# Patient Record
Sex: MALE | Race: BLACK OR AFRICAN AMERICAN | NOT HISPANIC OR LATINO | Employment: OTHER | URBAN - METROPOLITAN AREA
[De-identification: names, ages, dates, MRNs, and addresses within clinical notes are randomized per-mention and may not be internally consistent; named-entity substitution may affect disease eponyms.]

---

## 2018-12-20 ENCOUNTER — HOSPITAL ENCOUNTER (OUTPATIENT)
Dept: OCCUPATIONAL THERAPY | Facility: HOSPITAL | Age: 43
End: 2018-12-21
Attending: HOSPITALIST | Admitting: HOSPITALIST

## 2018-12-20 LAB
ABS NEUT (OLG): 3.14 X10(3)/MCL (ref 2.1–9.2)
ALBUMIN SERPL-MCNC: 3.9 GM/DL (ref 3.4–5)
ALBUMIN/GLOB SERPL: 1 RATIO (ref 1.1–2)
ALP SERPL-CCNC: 99 UNIT/L (ref 50–136)
ALT SERPL-CCNC: 26 UNIT/L (ref 12–78)
AMPHET UR QL SCN: ABNORMAL
APPEARANCE, UA: CLEAR
APTT PPP: 28.8 SECOND(S) (ref 24.8–36.9)
AST SERPL-CCNC: 19 UNIT/L (ref 15–37)
BACTERIA SPEC CULT: ABNORMAL /HPF
BARBITURATE SCN PRESENT UR: NEGATIVE
BASOPHILS # BLD AUTO: 0 X10(3)/MCL (ref 0–0.2)
BASOPHILS NFR BLD AUTO: 0 %
BENZODIAZ UR QL SCN: ABNORMAL
BILIRUB SERPL-MCNC: 0.3 MG/DL (ref 0.2–1)
BILIRUB UR QL STRIP: NEGATIVE
BILIRUBIN DIRECT+TOT PNL SERPL-MCNC: 0.1 MG/DL (ref 0–0.5)
BILIRUBIN DIRECT+TOT PNL SERPL-MCNC: 0.2 MG/DL (ref 0–0.8)
BUN SERPL-MCNC: 11 MG/DL (ref 7–18)
CALCIUM SERPL-MCNC: 8.6 MG/DL (ref 8.5–10.1)
CANNABINOIDS UR QL SCN: ABNORMAL
CHLORIDE SERPL-SCNC: 101 MMOL/L (ref 98–107)
CO2 SERPL-SCNC: 27 MMOL/L (ref 21–32)
COCAINE UR QL SCN: ABNORMAL
COLOR UR: YELLOW
CREAT SERPL-MCNC: 0.74 MG/DL (ref 0.7–1.3)
EOSINOPHIL # BLD AUTO: 0.1 X10(3)/MCL (ref 0–0.9)
EOSINOPHIL NFR BLD AUTO: 2 %
ERYTHROCYTE [DISTWIDTH] IN BLOOD BY AUTOMATED COUNT: 13.2 % (ref 11.5–17)
GLOBULIN SER-MCNC: 3.9 GM/DL (ref 2.4–3.5)
GLUCOSE (UA): NEGATIVE
GLUCOSE SERPL-MCNC: 79 MG/DL (ref 74–106)
HCT VFR BLD AUTO: 42.8 % (ref 42–52)
HGB BLD-MCNC: 13.5 GM/DL (ref 14–18)
HGB UR QL STRIP: NEGATIVE
INR PPP: 1 (ref 0–1.3)
KETONES UR QL STRIP: ABNORMAL
LEUKOCYTE ESTERASE UR QL STRIP: NEGATIVE
LYMPHOCYTES # BLD AUTO: 1.3 X10(3)/MCL (ref 0.6–4.6)
LYMPHOCYTES NFR BLD AUTO: 26 %
MCH RBC QN AUTO: 28.3 PG (ref 27–31)
MCHC RBC AUTO-ENTMCNC: 31.5 GM/DL (ref 33–36)
MCV RBC AUTO: 89.7 FL (ref 80–94)
MONOCYTES # BLD AUTO: 0.5 X10(3)/MCL (ref 0.1–1.3)
MONOCYTES NFR BLD AUTO: 10 %
NEUTROPHILS # BLD AUTO: 3.14 X10(3)/MCL (ref 2.1–9.2)
NEUTROPHILS NFR BLD AUTO: 61 %
NITRITE UR QL STRIP: NEGATIVE
OPIATES UR QL SCN: POSITIVE
PCP UR QL: ABNORMAL
PH UR STRIP.AUTO: 5.5 [PH] (ref 5–7.5)
PH UR STRIP: 5.5 [PH] (ref 5–9)
PLATELET # BLD AUTO: 273 X10(3)/MCL (ref 130–400)
PMV BLD AUTO: 9.7 FL (ref 9.4–12.4)
POTASSIUM SERPL-SCNC: 4 MMOL/L (ref 3.5–5.1)
PROT SERPL-MCNC: 7.8 GM/DL (ref 6.4–8.2)
PROT UR QL STRIP: NEGATIVE
PROTHROMBIN TIME: 13.6 SECOND(S) (ref 12.2–14.7)
RBC # BLD AUTO: 4.77 X10(6)/MCL (ref 4.7–6.1)
RBC #/AREA URNS HPF: ABNORMAL /[HPF]
SODIUM SERPL-SCNC: 138 MMOL/L (ref 136–145)
SP GR FLD REFRACTOMETRY: 1.03 (ref 1–1.03)
SP GR UR STRIP: 1.03 (ref 1–1.03)
SQUAMOUS EPITHELIAL, UA: ABNORMAL
TROPONIN I SERPL-MCNC: <0.02 NG/ML (ref 0.02–0.49)
TSH SERPL-ACNC: 0.49 MIU/L (ref 0.36–3.74)
UROBILINOGEN UR STRIP-ACNC: 0.2
WBC # SPEC AUTO: 5.1 X10(3)/MCL (ref 4.5–11.5)
WBC #/AREA URNS HPF: ABNORMAL /HPF

## 2018-12-21 LAB
INR PPP: 1.1 (ref 0–1.3)
PROTHROMBIN TIME: 14.4 SECOND(S) (ref 12.2–14.7)

## 2020-08-04 ENCOUNTER — HISTORICAL (OUTPATIENT)
Dept: ADMINISTRATIVE | Facility: HOSPITAL | Age: 45
End: 2020-08-04

## 2020-08-04 LAB — RAPID GROUP A STREP (OHS): NEGATIVE

## 2022-03-07 ENCOUNTER — HISTORICAL (OUTPATIENT)
Dept: ADMINISTRATIVE | Facility: HOSPITAL | Age: 47
End: 2022-03-07

## 2022-04-11 ENCOUNTER — HISTORICAL (OUTPATIENT)
Dept: ADMINISTRATIVE | Facility: HOSPITAL | Age: 47
End: 2022-04-11
Payer: MEDICARE

## 2022-04-27 VITALS
OXYGEN SATURATION: 96 % | DIASTOLIC BLOOD PRESSURE: 85 MMHG | BODY MASS INDEX: 25.26 KG/M2 | WEIGHT: 160.94 LBS | SYSTOLIC BLOOD PRESSURE: 125 MMHG | HEIGHT: 67 IN

## 2022-04-30 NOTE — ED PROVIDER NOTES
Patient:   Bryon Paulson            MRN: [id number]            FIN: [id number]               Age:   43 years     Sex:  Male     :  1975   Associated Diagnoses:   Deep venous thrombosis of right upper extremity; Subclavian vein thrombosis, right; Arm pain-swelling; Chronic pain syndrome; Crohn's disease; Hypertension   Author:   Kenneth Burnett MD      Basic Information   Time seen: Date & time 2018 12:49:00.   History source: Patient.   Arrival mode: Private vehicle, walking.   History limitation: None.   Additional information: Chief Complaint from Nursing Triage Note : Chief Complaint   2018 12:03 CST     Chief Complaint           Rt arm swelling w/bruising, painful radiating towards Rt hand/fingers x monday. Swelling comes and goes.  Threw 12 pack of soda with Rt arm  , I have assummed care of this patient at 1449 . DWMMD.      History of Present Illness   The patient presents with right, arm pain, arm swelling, This is a 43-year-old male who presents with nontraumatic right arm pain and swelling for the past 3 days.  Some ecchymosis is noted around his elbow area.,     44 y/o black male with hx of Crohn's Disease, HTN, and gastritisis presents to ED c/o arm pain and swelling. Pt denies of coughing, irregular bowel movements, any heart trouble, or being on any anticoagulants. Pt reports being on Lisinopril, Zofran, and anti-inflammatory medication for Crohn's Disease. Pt was given 1000 mL of NS in ED. Pt was given a deep midline IV in ED but not a pickline in previous visit. .  The onset was 18.  The course/duration of symptoms is constant.  Type of injury: Pain and swelling.  Location: Right arm. Radiating pain: Right hand and digits. The character of symptoms is pain and swelling.  The degree of pain is moderate.  The degree of swelling is moderate.  The exacerbating factor is none.  The relieving factor is none.  Risk factors consist of Crohn's Disease.  The patient's  dominant hand is the right hand.  Prior episodes: none.  Therapy today: none.  Associated symptoms: none.        Review of Systems   Constitutional symptoms:  Negative except as documented in HPI.   Skin symptoms:  Negative except as documented in HPI.   Eye symptoms:  Negative except as documented in HPI.   ENMT symptoms:  Negative except as documented in HPI.   Respiratory symptoms:  No cough,    Cardiovascular symptoms:  Negative except as documented in HPI.   Gastrointestinal symptoms:  No diarrhea,    Genitourinary symptoms:  Negative except as documented in HPI.   Musculoskeletal symptoms:  Reports: Right, upper arm, pain, swelling.   Neurologic symptoms:  Negative except as documented in HPI.   Psychiatric symptoms:  Negative except as documented in HPI.   Endocrine symptoms:  Negative except as documented in HPI.   Hematologic/Lymphatic symptoms:  Negative except as documented in HPI.   Allergy/immunologic symptoms:  Negative except as documented in HPI.             Additional review of systems information: All other systems reviewed and otherwise negative.      Health Status   Allergies:    Allergic Reactions (All)  Severity Not Documented  Bentyl- Decrease in loc.  Nubain- No reactions were documented.  Toradol- No reactions were documented.  Ultracet- No reactions were documented.  Canceled/Inactive Reactions (All)  Severity Not Documented  Dilaudid- No reactions were documented.  Imodium A-D- No reactions were documented..   Medications:  (Selected)   Inpatient Medications  Ordered  Zofran 2 mg/mL injectable solution: 8 mg, form: Injection, IV Push, Once, first dose 18 12:12:00 CDT, stop date 18 12:12:00 CDT, STAT  Prescriptions  Prescribed  Percocet 5/325 oral tablet: 1 tab(s), Oral, q4hr, PRN PRN for pain, # 20 tab(s), 0 Refill(s)  Zofran ODT 4 mg oral tablet, disintegratin mg = 1 tab(s), Oral, q6hr, PRN PRN nausea, # 20 tab(s), 0 Refill(s), Pharmacy: [facility]/pharmacy #[id number]  Zofran ODT 8 mg  oral tablet, disintegratin, SL, q6hr, PRN PRN nausea/vomiting, # 12 tab(s), 0 Refill(s)  Documented Medications  Documented  LISINOPRIL 10 MG TABLET: 10 mg = 1 tab(s), Oral, Daily.   Immunizations: Influenza, no tetanus up to date, no pneumococcal.      Past Medical/ Family/ Social History   Medical history:    Resolved  Hypertension ([id number]):  Resolved.  Crohn disease (3286216894):  Resolved.  Gastritis (7861528):  Resolved.  GI bleed ([id number]):  Resolved..   Surgical history:    Resection of small intestine ([id number]).  Cholecystectomy (92685054).  insertion and removel of mediport..   Family history:     Father: Living and has HTN, DM  Mother: Living and has HTN, DM.   Social history: Alcohol use: Denies, Tobacco use: Denies, Drug use: Denies, Occupation: Employed, Family/social situation: Unmarried, lives with parent(s).      Physical Examination               Vital Signs   Vital Signs   2018 12:03 CST     Temperature Oral          37.1 DegC                             Temperature Oral (calculated)             98.78 DegF                             Temperature Tympanic      In Error DegC  (In Error)                            Peripheral Pulse Rate     104 bpm  HI                             Respiratory Rate          20 br/min                             SpO2                      98 %                             Systolic Blood Pressure   139 mmHg                             Diastolic Blood Pressure  92 mmHg  HI  .      No qualifying data available.   Measurements   2018 12:03 CST     Weight Dosing             75.1 kg                             Weight Measured           75.1 kg                             Weight Measured and Calculated in Lbs     165.57 lb                             Height/Length Dosing      169 cm                             Height/Length Measured    169 cm                             BSA Measured              1.88 m2                              Body Mass Index Measured  26.29 kg/m2  .   Basic Oxygen Information   12/20/2018 12:03 CST     SpO2                      98 %  .   General:  Alert, no acute distress, not anxious, not ill-appearing.    Skin:  Warm, dry, no pallor, no rash, normal for ethnicity.    Head:  Normocephalic, atraumatic.    Neck:  Supple, trachea midline, no tenderness, no carotid bruit.    Eye:  Pupils are equal, round and reactive to light, extraocular movements are intact, normal conjunctiva.    Ears, nose, mouth and throat:  Tympanic membranes clear, oral mucosa moist, no pharyngeal erythema or exudate.    Cardiovascular:  Regular rate and rhythm, No murmur, No edema, Capillary refill: <3 seconds.    Respiratory:  Lungs are clear to auscultation, respirations are non-labored, breath sounds are equal.    Chest wall:  No tenderness, No deformity.    Back:  Nontender, Normal range of motion, Normal alignment, no step-offs.    Musculoskeletal:  Normal ROM, normal strength, no tenderness, no swelling, no deformity, Swelling of the right arm extends from the fingers up into the shoulder involving jugulovenous distention on the right side of the neck.  There is fullness of the right neck not present on the left.  Fingers is slightly dusky.  The capillary refill is adequate rate and ulnar pulses are intact., Distal upper extremity: Right, swelling.    Gastrointestinal:  Soft, Nontender, Non distended, Normal bowel sounds, No organomegaly.    Genitourinary:  no CVA tenderness.   Neurological:  Alert and oriented to person, place, time, and situation, No focal neurological deficit observed, CN II-XII intact, normal sensory observed, normal motor observed, normal speech observed, normal coordination observed, normal and symmetrical reflexes, Right arm distal neurovascularly intact.    Lymphatics:  No lymphadenopathy.   Psychiatric:  Cooperative, appropriate mood & affect, normal judgment, non-suicidal.       Medical Decision Making   Differential  "Diagnosis:  deep venous thrombosis arterial occlusion.   Documents reviewed:  Emergency department nurses' notes.   Orders  Launch Order Profile (Selected)   Inpatient Orders  Ordered  30 Day Readmission: 18 12:56:56 CST, Stop date 18 12:56:56 CST, "This patient has had an inpatient, observation, outpatient bedded or emergency visit within the last 30 days."  Admission Assessment Adult: 18 15:30:34 CST, Stop date 18 15:30:34 CST  Admission History Adult: 18 15:30:34 CST, Stop date 18 15:30:34 CST  Admit to Outpatient Observation: 18 15:07:00 CST, Beth MCKEON, Radha PALOMINO Remote Telemetry, Deep venous thrombosis of right upper extremity  Subclavian vein thrombosis, right  Crohn's disease  Hypertension  Chronic pain syndrome, No  Basic Admission Information Adult: 18 15:30:34 CST, Stop date 18 15:30:34 CST  Capacity Management Bed Request: 18 15:09:41 CST, Remote Telemetry  Discharge Planning Ongoing Assessment: 18 9:00:00 CST, q3day  EK18 14:49:00 CST, Stat, 18 14:49:00 CST, Wheelchair, Patient Has IV, -1, -1, 18 14:49:00 CST  Fall Risk Protocol: 18 14:31:54 CST, Constant Order  Lovenox: 80 mg, form: Injection, Subcutaneous, q12hr, first dose 18 15:07:00 CST, NOW  [id number] 1,000 mL: 1,000 mL, 1,000 mL, IV, 125 mL/hr, start date 18 14:49:00 CST  Ordered (Dispatched)  Drug Screen Urine: Stat collect, Urine, 18 15:04:00 CST, Stop date 18 15:04:00 CST, Nurse collect, Print Label By Order Location, 18 15:04:00 CST  UA Total a reflex to culture: Stat collect, Urine, 18 14:49:00 CST, Once, Stop date 18 14:50:00 CST, Nurse collect, Print Label By Order Location  Ordered (Exam Started)  XR Chest 1 View: Stat, 18 14:49:00 CST, Cough, None, Wheelchair, Patient Has IV?, Rad Type, Not Scheduled, 18 14:49:00 CST  Ordered (In-Lab)  Automated Diff: Now collect, 18 15:30:00 CST, Blood, " Collected, Stop date 12/20/18 15:30:00 CST, Lab Collect, Print Label By Order Location, 12/20/18 14:49:00 CST  CBC w/ Auto Diff: Now collect, 12/20/18 14:49:00 CST, Blood, Stop date 12/20/18 14:50:00 CST, Lab Collect, Print Label By Order Location, 12/20/18 14:49:00 CST  CMP: Stat collect, 12/20/18 14:49:00 CST, Blood, Stop date 12/20/18 14:50:00 CST, Lab Collect, Print Label By Order Location, 12/20/18 14:49:00 CST  INR - Protime: Stat collect, 12/20/18 14:49:00 CST, Blood, Once, Stop date 12/20/18 14:50:00 CST, Lab Collect, Print Label By Order Location, 12/20/18 14:49:00 CST  PTT: Stat collect, 12/20/18 14:49:00 CST, Blood, Once, Stop date 12/20/18 14:50:00 CST, Lab Collect, Print Label By Order Location, 12/20/18 14:49:00 CST  TSH: Stat collect, 12/20/18 14:49:00 CST, Blood, Once, Stop date 12/20/18 14:50:00 CST, Lab Collect, Print Label By Order Location, 12/20/18 14:49:00 CST  Troponin-I: Stat collect, 12/20/18 14:49:00 CST, Blood, Stop date 12/20/18 14:50:00 CST, Lab Collect, Print Label By Order Location, 12/20/18 14:49:00 CST  Completed  US NIVA Arterial Upper Ext Right: 12/20/18 12:51:00 CST, 12/20/18 12:51:00 CST, Wheelchair, Patient Has IV, -1, -1, 12/20/18 12:51:00 CST  US NIVA Venous Upper Ext Right: 12/20/18 12:51:00 CST, Stop date 12/20/18 12:51:00 CST, Wheelchair, Patient has IV.   Results review:  Lab work has been ordered and is pending patient is admitted with the extensive DVT in the right upper extremity is speaking with the patient.  He did have a midline IV deep started here in the emergency department with ultrasound assistance last admission it was not a PICC line was not a long-term IV..      Reexamination/ Reevaluation   Time: 12/20/2018 15:58:00 .   Vital signs   results included from flowsheet : Vital Signs   12/20/2018 15:50 CST     Peripheral Pulse Rate     99 bpm                             SpO2                      100 %                             Oxygen Therapy            Room air                              Systolic Blood Pressure   143 mmHg  HI                             Diastolic Blood Pressure  116 mmHg  HI                             Mean Arterial Pressure, Cuff              125 mmHg     Interventions: Patient is admitted as stated above last admission patient had a midline deep ultrasound initiated in the emergency department with ultrasound assistance.  He did not have a long-term PICC line however.      Impression and Plan   Diagnosis   Deep venous thrombosis of right upper extremity (ROA95-LK I82.621)   Subclavian vein thrombosis, right (SVT70-OP I82.B11)   Arm pain-swelling (PNED [id number])   Chronic pain syndrome (OYR64-VA G89.4)   Crohn's disease (RAE99-PF K50.90)   Hypertension (QVR78-MM I10)      Calls-Consults   -  12/20/2018 15:59:00 , Beth MCKEON, Radha DILL    Plan   Condition: Unchanged.    Disposition: Admit time  12/20/2018 15:59:00, Admit to Inpatient Unit.    Counseled: Patient, Regarding diagnosis, Regarding diagnostic results, Regarding treatment plan, Patient indicated understanding of instructions.    Notes:   I, Chloe Cronin, acted solely as a scribe for and in the presence of Dr. Burnett, who performed the service. , I, Kenneth Burnett MD, a physician licensed to practice in this state, have  performed the physical evaluation,  history gathering,  and medical decision making that is reflected in this record..   BLADIMIR Burnett MD.

## 2022-04-30 NOTE — DISCHARGE SUMMARY
Patient:   Bryon Paulson            MRN: [id number]            FIN: [id number]               Age:   43 years     Sex:  Male     :  1975   Associated Diagnoses:   Crohn's disease; Hypertension; Chronic pain syndrome; Acute deep vein thrombosis (DVT)   Author:   Ja Barth MD      Discharge Information      Discharge Summary Information   Admit/Discharge Dates   Admit Date: 2018  Discharge Date: 2018   Physicians   Attending Physician - aJ Barth MD  Attending Physician - ER, Physician  Admitting Physician - ER, Physician  Consulting Physician - Radha Campos MD  Primary Care Physician - PCP, Clinic   Discharge Diagnosis   Crohn's disease, unspecified, without complications (K50.90)   Essential (primary) hypertension (I10)   Acute embolism and thrombosis of unspecified deep veins of unspecified lower extremity (I82.409)   Chronic pain syndrome (G89.4)    Procedures   No procedures recorded for this visit.   Immunizations   No immunizations recorded for this visit.   Discharge Medications   Prescribed  acetaminophen-HYDROcodone (acetaminophen-hydrocodone 325 mg-10 mg oral tablet) 1 tab(s), Oral, q4hr, PRN as needed for pain  lisinopril (lisinopril 10 mg oral tablet) 10 mg, Oral, Daily  methylPREDNISolone (methylPREDNISolone 4 mg oral tab) See Instructions  rivaroxaban (Xarelto Starter Pack 15 mg-20 mg oral kit) See Instructions  Continue  acetaminophen-HYDROcodone (HYDROCO/APAP TAB 10-325MG), Oral, q4hr  acetaminophen-oxyCODONE (Percocet 5/325 oral tablet) 1 tab(s), Oral, q4hr, PRN for pain  lisinopril (LISINOPRIL 10 MG TABLET) 10 mg, Oral, Daily  ondansetron (ONDANSETRON ODT 8 MG TABLET), Oral, q6hr      Education   Discharge - 18 8:24:00 CST, Home, Give all scheduled vaccinations prior to discharge.   Discharge Activity - Activity as Tolerated   Discharge Diet - Regular       Hospital Course   Hospital Course   The patient states that he was in his usual state of  health and started noticing swelling to the right upper extremity over the past couple of days. He continued to get worse and cause him some pain so he came in to be evaluated. He denies any trauma to the arm or immobility. He has no history of any blood clots in the past and no history in his family. Of note he does state that he had a hospitalization over Thanksgiving for Crohn's disease at which time he had what sounds like a midline catheter in that same arm. He states that it was removed prior to discharge and he does not recall any issues with it. He denies any other complaints at this time. With his history of Crohn's disease he is not on any immunosuppressants currently and has not been for many years. On morning of discharge he is feeling well.  Pain controlled.  I counselled him on risks and benefits of intiating Xarelto.  CM has been consulted to assure he can afford it.  I discussed that I would give him a short course of pain medicine but he would need to follow up with his PCP.  I informed the patient of the risks associated with opiate use and the option to fill less than the prescribed amount.  Will need oral AC for minimum 4 - 6 months.      Time to discharge 31 minutes  .        Physical Examination      Vital Signs (last 24 hrs)_____  Last Charted___________  Temp Oral     37 DegC  (DEC 21 07:09)  Heart Rate Peripheral   95 bpm  (DEC 21 07:09)  Resp Rate         20 br/min  (DEC 21 07:09)  SBP      H 143mmHg  (DEC 21 07:09)  DBP      H 98mmHg  (DEC 21 07:09)  SpO2      100 %  (DEC 21 07:09)   General:  Alert and oriented, No acute distress.    Eye:  Pupils are equal, round and reactive to light, Extraocular movements are intact, Normal conjunctiva.    HENT:  Normocephalic, Oral mucosa is moist.    Neck:  Supple, Non-tender, No lymphadenopathy.    Respiratory:  Lungs are clear to auscultation, Respirations are non-labored, Breath sounds are equal, Symmetrical chest wall expansion.    Cardiovascular:   Normal rate, Regular rhythm, No murmur, No gallop, Good pulses equal in all extremities, No edema.    Gastrointestinal:  Soft, Non-tender, Non-distended, Normal bowel sounds, No organomegaly.    Musculoskeletal:  Normal range of motion, No deformity, Normal gait.    Integumentary:  Warm, Dry, Intact.    Neurologic:  Alert, Oriented, No focal deficits.    Psychiatric:  Cooperative, Appropriate mood & affect.       Discharge Plan   Diagnosis     Crohn's disease (GRO17-WI K50.90).     Hypertension (ZKK31-PA I10).     Chronic pain syndrome (POX76-DM G89.4).     Acute deep vein thrombosis (DVT) (QDA29-NN I82.409).

## 2022-05-05 NOTE — HISTORICAL [facility]
This is a historical note converted from [employer]. Formatting and pictures may have been removed.  Please reference [employer] for original formatting and attached multimedia. Chief Complaint  reports R arm pain and swelling x3 days. denies fever. denies injury or trauma to area.  History of Present Illness  The patient states that he was?in his usual state of health and started noticing?swelling to the right upper extremity?over the past couple of days.? He continued to get worse?and?cause him some pain so he came in to be evaluated.? He denies any trauma?to the arm?or immobility. ?He has no history of any blood clots in the past and no history in his family.? Of note he does state that he had?a hospitalization?over Thanksgiving for Crohns disease at which time he had?what sounds like a?midline catheter?in that same arm. ?He states that it was removed prior to discharge and he does not recall any issues with it.? He denies any other complaints at this time.? With his history of Crohns disease he is not on any immunosuppressants currently and has not been for many years.  Review of Systems  Constitutional: No fever or chills, no weakness or fatigue.  Ear/Nose/Mouth/Throat: No nasal congestion or sore throat.  Respiratory: No shortness of breath or cough.  Cardiovascular: No chest pain or palpitations but he does have edema in the right upper extremity  Gastrointestinal: No nausea, vomiting, or abdominal pain.  Genitourinary: No dysuria.  Musculoskeletal: Pain in the right arm  Neurologic: No weakness, tingling, numbness.  Integumentary: Negative.  Physical Exam  Vitals & Measurements  T:?36.7? ?C (Oral)? TMIN:?36.6? ?C (Oral)? TMAX:?36.7? ?C (Oral)? HR:?99(Peripheral)? RR:?14? BP:?152/105? SpO2:?100%? WT:?77?kg? WT:?77?kg?  General: Well-developed, well-nourished, no acute distress, alert and oriented x3.  HEENT: Normocephalic, atraumatic, sclera nonicteric, conjunctiva clear  Heart: Regular rate and rhythm without  gallops or murmurs.  Lungs: Bilaterally clear to auscultation.  Abdomen: Soft, non-tender, positive normoactive bowel sounds.  Extremities: No cyanosis or clubbing?but he does have swelling to the right upper extremity that is nonpitting. ?He has a scar?in the medial bicep area?from where the last?IV was placed?that is well-healed.? He has 2+ pulses in all extremities and good capillary refill.  Neurologic: CN II-XII intact, no focal weakness, gait normal  Psychiatric: Normal mood and affect, normal judgement  Integument: No rashes or lesions, intact  ?  Assessment/Plan  Chronic pain syndrome?G89.4  Crohns disease?K50.90  Deep venous thrombosis of right upper extremity?I82.621  Hypertension?I10  Subclavian vein thrombosis, right?I82.B11  Patient is stable at this time and has been started on full dose Lovenox.? Even though it has been?several weeks my suspicion is that?he had some early development of?a DVT with the placement of the midline catheter?that progressed slowly over the last?few weeks to the point that it is now.? He will need oral anticoagulants for?6 months and he may need a workup for?hypercoagulable state?once he has completed it.? I will consult the  to evaluate for?whether or not he can have?an anticoagulant other than warfarin?which would be the most ideal.? If that is the case he can be started on that and discharged?in the morning.? He does not currently have a PCP?and he had recently called?Medicaid in order to try to obtain a new one.   Problem List/Past Medical History  Ongoing  Deep venous thrombosis of right upper extremity  Hypertension  Historical  Crohn disease  Gastritis  GI bleed  Procedure/Surgical History  Cholecystectomy  insertion and removel of mediport  Resection of small intestine   Medications  Inpatient  Lovenox, 80 mg= 0.8 mL, Subcutaneous, q12hr  [id number] 1,000 mL, 1000 mL, IV  Zofran 2 mg/mL injectable solution, 8 mg= 4 mL, IV Push, Once  Home  HYDROCO/APAP TAB  10-325MG, Oral, q4hr  LISINOPRIL 10 MG TABLET, 10 mg= 1 tab(s), Oral, Daily  METHYLPREDNISOLONE 4 MG DOSEPK,? ?Not Taking, Completed Rx  ONDANSETRON ODT 8 MG TABLET, Oral, q6hr  Percocet 5/325 oral tablet, 1 tab(s), Oral, q4hr, PRN  Allergies  Bentyl?(decrease in LOC)  Nubain  Toradol  Ultracet  Social History  Alcohol - Denies Alcohol Use, 02/10/2012  Employment/School - No Risk, 05/18/2012  Exercise - Does not exercise, 05/18/2012  Home/Environment - No Risk, 05/18/2012  Nutrition/Health - No Risk, 05/18/2012  Other - No Risk, 05/18/2012  Sexual - No Risk, 05/18/2012  Substance Abuse - Denies Substance Abuse, 02/10/2012  Tobacco - Denies Tobacco Use, 02/10/2012  Never smoker, N/A, 12/20/2018  Never smoker, No, 11/22/2018  Family History  Diabetes mellitus type 1: Negative: Mother.  Hypertension.: Mother and Father.  Immunizations  Vaccine Date Status   influenza virus vaccine, inactivated 11/23/2018 Given   Lab Results  Labs Last 24 Hours?  ?Chemistry? Hematology/Coagulation?   Sodium Lvl: 138 mmol/L (12/20/18 15:30:00) PT: 13.6 second(s) (12/20/18 15:30:00)   Potassium Lvl: 4 mmol/L (12/20/18 15:30:00) INR: 1 (12/20/18 15:30:00)   Chloride: 101 mmol/L (12/20/18 15:30:00) PTT: 28.8 second(s) (12/20/18 15:30:00)   CO2: 27 mmol/L (12/20/18 15:30:00) WBC: 5.1 x10(3)/mcL (12/20/18 15:30:00)   Calcium Lvl: 8.6 mg/dL (12/20/18 15:30:00) RBC: 4.77 x10(6)/mcL (12/20/18 15:30:00)   Glucose Lvl: 79 mg/dL (12/20/18 15:30:00) Hgb:?13.5 gm/dL?Low (12/20/18 15:30:00)   BUN: 11 mg/dL (12/20/18 15:30:00) Hct: 42.8 % (12/20/18 15:30:00)   Creatinine: 0.74 mg/dL (12/20/18 15:30:00) Platelet: 273 x10(3)/mcL (12/20/18 15:30:00)   eGFR-AA: >60 (12/20/18 15:30:00) MCV: 89.7 fL (12/20/18 15:30:00)   eGFR-FRANCA: >60 (12/20/18 15:30:00) MCH: 28.3 pg (12/20/18 15:30:00)   Bili Total: 0.3 mg/dL (12/20/18 15:30:00) MCHC:?31.5 gm/dL?Low (12/20/18 15:30:00)   Bili Direct: 0.1 mg/dL (12/20/18 15:30:00) RDW: 13.2 % (12/20/18 15:30:00)   Bili  Indirect: 0.2 mg/dL (12/20/18 15:30:00) MPV: 9.7 fL (12/20/18 15:30:00)   AST: 19 unit/L (12/20/18 15:30:00) Abs Neut: 3.14 x10(3)/mcL (12/20/18 15:30:00)   ALT: 26 unit/L (12/20/18 15:30:00) Neutro Auto: 61 % (12/20/18 15:30:00)   Alk Phos: 99 unit/L (12/20/18 15:30:00) Lymph Auto: 26 % (12/20/18 15:30:00)   Total Protein: 7.8 gm/dL (12/20/18 15:30:00) Mono Auto: 10 % (12/20/18 15:30:00)   Albumin Lvl: 3.9 gm/dL (12/20/18 15:30:00) Eos Auto: 2 % (12/20/18 15:30:00)   Globulin:?3.9 gm/dL?High (12/20/18 15:30:00) Abs Eos: 0.1 x10(3)/mcL (12/20/18 15:30:00)   A/G Ratio:?1 ratio?Low (12/20/18 15:30:00) Basophil Auto: 0 % (12/20/18 15:30:00)   Troponin-I: <0.02 (12/20/18 15:30:00) Abs Neutro: 3.14 x10(3)/mcL (12/20/18 15:30:00)   TSH: 0.495 mIU/L (12/20/18 15:30:00) Abs Lymph: 1.3 x10(3)/mcL (12/20/18 15:30:00)   U pH: 5.5 (12/20/18 15:45:00) Abs Mono: 0.5 x10(3)/mcL (12/20/18 15:30:00)   U Spec Grav: 1.034 (12/20/18 15:45:00) Abs Baso: 0 x10(3)/mcL (12/20/18 15:30:00)   ?  ?  Diagnostic Results  Right upper extremity ultrasound shows an acute deep vein thrombosis in the right internal jugular subclavian, axillary, proximal through the distal brachial veins.

## 2022-05-05 NOTE — HISTORICAL [facility]
This is a historical note converted from [employer]. Formatting and pictures may have been removed.  Please reference [employer] for original formatting and attached multimedia. Chief Complaint  abdominal pain and diarrhea x 1 week. Sinus drip and sore throat x 4 days  History of Present Illness  45-year-old male who presents for evaluation of abdominal pain, diarrhea, postnasal drip and sore throat for the last?4 to 7 days.? The patient states that he has a history of Crohns disease.? He states his last Crohns flare was approximately?5 months ago.? He admits to multiple abdominal surgeries in the past however he states his last?abdominal surgery was approximately 10 years ago.? He denies any fever,?vomiting,?but admits to?abdominal pain rated at approximately 7/10?as well as approximately 6 episodes of diarrhea?daily.  Review of Systems  Constitutional_fatigue, no fever  ENMT_ sore throat, no ear pain, no sinus pain,? nasal congestion/drainage  Respiratory_no cough, no wheezing, no shortness of breath  Cardiovascular_no chest pain, no palpitations, no edema  Gastrointestinal_no nausea, no vomiting, diarrhea, abdominal pain  Genitourinary_no dysuria, no urinary frequency or urgency, no hematuria  Integumentary_no skin rash  ?  Physical Exam  Vitals & Measurements  T:?37.1? ?C (Oral)? HR:?102(Peripheral)? RR:?22? BP:?125/85? SpO2:?96%?  HT:?170.16?cm? WT:?73.000?kg? BMI:?25.21?  General_well-developed well-nourished in no acute distress  Eye_clear conjunctiva  Respiratory_clear to auscultation bilaterally, symmetric chest rise, nonlabored respirations, no wheezing, no rhonchi  Cardiovascular_regular rate and rhythm without murmurs, gallops or rubs  Gastrointestinal_abdomen mild to moderately distended, mild rebound tenderness,?mild guarding,?normal bowel sounds,?no palpable masses,?moderate?tenderness diffusely  Integumentary_no rashes, warm dry with good skin turgor,?vertical well-healed?scar?that from?the inferior  sternum?to the lower abdomen  ?  Assessment/Plan  1.?Abdominal pain?R10.9  ?Plan erect x-ray of the abdomen shows no acute abnormality  Counseled patient that he should be further evaluated in the ED immediately with CT abdomen and pelvis due to above exam.? Patient declines and states that he is not interested in going to the ED due to current coronavirus pandemic.  Strict ED precautions discussed with patient. ?He verbalized understanding and agreement.  Start Cipro and Flagyl as prescribed  Start prednisone 20 mg twice daily x3 days  Increase oral hydration  May use Tylenol as needed for pain  Ordered:  Office/Outpatient Visit Level 4 Established [id number] PC, Diarrhea  Hx of Crohns disease  Acute pharyngitis, unspecified  Abdominal pain, [facility]CC-SMP, 08/04/20 16:30:00 CDT  [facility]ARS COVID-19 PCR- [facility], Routine collect, Nasopharyngeal Swab, Order for future visit, 08/04/20 16:30:00 CDT, Once, Stop date 08/04/20 16:30:00 CDT, Nurse collect, Abdominal pain  Sore throat  Diarrhea  Crohn disease  ?  2.?Diarrhea?R19.7  See instructions above  Ordered:  Office/Outpatient Visit Level 4 Established 90782 PC, Diarrhea  Hx of Crohns disease  Acute pharyngitis, unspecified  Abdominal pain, UCC-SMP, 08/04/20 16:30:00 CDT  SARS COVID-19 PCR- [facility], Routine collect, Nasopharyngeal Swab, Order for future visit, 08/04/20 16:30:00 CDT, Once, Stop date 08/04/20 16:30:00 CDT, Nurse collect, Abdominal pain  Sore throat  Diarrhea  Crohn disease  ?  3.?Hx of Crohns disease?Z87.19  ?See instructions above  Ordered:  Office/Outpatient Visit Level 4 Established 97237 PC, Diarrhea  Hx of Crohns disease  Acute pharyngitis, unspecified  Abdominal pain, UCC-SMP, 08/04/20 16:30:00 CDT  ?  4.?Acute pharyngitis, unspecified?J02.9  ?Rapid strep negative  Due to patients history of Crohns disease, diarrhea and sore throat?with postnasal drip?patient states that he is interested in COVID-19 testing.  Concern for COVID-19  infection  Swabs obtained and sent for testing  Home care for quarantine discussed. ?Strict ED precautions?discussed.  Ordered:  Office/Outpatient Visit Level 4 Established [id number] PC, Diarrhea  Hx of Crohns disease  Acute pharyngitis, unspecified  Abdominal pain, UCC-SMP, 08/04/20 16:30:00 CDT  SARS COVID-19 PCR- [facility], Routine collect, Nasopharyngeal Swab, Order for future visit, 08/04/20 16:30:00 CDT, Once, Stop date 08/04/20 16:30:00 CDT, Nurse collect, Abdominal pain  Sore throat  Diarrhea  Crohn disease  ?  Orders:  ciprofloxacin, 500 mg = 1 tab(s), Oral, q12hr, X 7 day(s), # 14 tab(s), 0 Refill(s), [id number], 170.16, cm, Height/Length Dosing, 08/04/20 15:35:00 CDT, 73, kg, Weight Dosing, 08/04/20 15:35:00 CDT  metroNIDAZOLE, 500 mg = 1 tab(s), Oral, q8hr, X 7 day(s), # 21 tab(s), 0 Refill(s), Pharmacy: [id number], 170.16, cm, Height/Length Dosing, 08/04/20 15:35:00 CDT, 73, kg, Weight Dosing, 08/04/20 15:35:00 CDT  predniSONE, 20 mg = 1 tab(s), Oral, BID, X 3 day(s), # 6 tab(s), 0 Refill(s), [id number], 170.16, cm, Height/Length Dosing, 08/04/20 15:35:00 CDT, 73, kg, Weight Dosing, 08/04/20 15:35:00 CDT   Problem List/Past Medical History  Ongoing  Deep venous thrombosis of right upper extremity  Hypertension  Historical  Crohn disease  Gastritis  GI bleed  Procedure/Surgical History  Other endoscopy of small intestine (05/25/2012)  Closed [endoscopic] biopsy of large intestine (05/23/2012)  Central Venous Catheter Placement with Guidance (05/20/2012)  Incision and drainage of hematoma, seroma or fluid collection. (02/13/2012)  Other incision with drainage of skin and subcutaneous tissue (02/13/2012)  Cholecystectomy  insertion and removel of mediport  Resection of small intestine   Medications  ciprofloxacin 500 mg oral tablet, 500 mg= 1 tab(s), Oral, q12hr  lisinopril 10 mg oral tablet, 10 mg= 1 tab(s), Oral, Daily  LISINOPRIL 10 MG TABLET, 10 mg= 1  tab(s), Oral, Daily  metronidazole 500 mg oral tablet, 500 mg= 1 tab(s), Oral, q8hr  Ofirmev, 1000 mg= 100 mL, IV Piggyback, Once  prednisONE 20 mg oral tablet, 20 mg= 1 tab(s), Oral, BID  Zofran 2 mg/mL injectable solution, 8 mg= 4 mL, IV Push, Once  Zofran ODT 4 mg oral tablet, disintegrating, 4 mg= 1 tab(s), Oral, q8hr, PRN  Zofran ODT 4 mg oral tablet, disintegrating, 4 mg= 1 tab(s), Oral, q8hr, PRN,? ?Not Taking per Prescriber  Zofran ODT 8 mg oral tablet, disintegrating, 8 mg= 1 tab(s), Oral, BID  Allergies  Bentyl?(decrease in LOC)  Nubain  Toradol  Ultracet  Social History  Abuse/Neglect  No, 08/04/2020  No, No, Yes, 06/16/2020  No, No, Yes, 05/21/2020  No, 03/20/2020  Alcohol - Denies Alcohol Use, 02/10/2012  Never, 03/20/2020  Employment/School - No Risk, 05/18/2012  Exercise - Does not exercise, 05/18/2012  Home/Environment - No Risk, 05/18/2012  Nutrition/Health - No Risk, 05/18/2012  Other - No Risk, 05/18/2012  Sexual - No Risk, 05/18/2012  Substance Use - Denies Substance Abuse, 02/10/2012  Never, 03/20/2020  Tobacco - Denies Tobacco Use, 02/10/2012  Never (less than 100 in lifetime), N/A, 08/04/2020  Never (less than 100 in lifetime), No, 06/16/2020  Never (less than 100 in lifetime), No, 05/21/2020  Never (less than 100 in lifetime), N/A, 03/20/2020  Never (less than 100 in lifetime), N/A, 05/14/2019  Never smoker, N/A, 12/20/2018  Never smoker, No, 11/22/2018  Family History  Diabetes mellitus type 1: Negative: Mother.  Hypertension.: Mother and Father.  Immunizations  Vaccine Date Status   influenza virus vaccine, inactivated 11/23/2018 Given   Diagnostic Results  Accession:?[id number]  Order:?XR Abdomen Flat and Erect  Report Dt/Tm:?08/04/2020 16:30  Report:?  EXAMINATION:  XR Abdomen Flat and Erect  ?  INDICATION:  Abdominal pain, Crohns disease  ?  Comparison: CT abdomen pelvis dated 9/13/2019  ?  FINDINGS:  There is no evidence of free intraperitoneal air. There are no dilated  loops of  small bowel or small bowel air-fluid levels to suggest  obstruction. There is no intra-abdominal mass effect. The lung bases  are clear.  ?  ?  IMPRESSION:  No acute abnormality identified.  ?

## 2022-05-12 ENCOUNTER — HOSPITAL ENCOUNTER (EMERGENCY)
Facility: HOSPITAL | Age: 47
Discharge: HOME | End: 2022-05-12
Attending: EMERGENCY MEDICINE
Payer: MEDICARE

## 2022-05-12 VITALS
WEIGHT: 165 LBS | BODY MASS INDEX: 25.9 KG/M2 | RESPIRATION RATE: 16 BRPM | SYSTOLIC BLOOD PRESSURE: 122 MMHG | TEMPERATURE: 97 F | HEIGHT: 67 IN | DIASTOLIC BLOOD PRESSURE: 94 MMHG | HEART RATE: 94 BPM | OXYGEN SATURATION: 100 %

## 2022-05-12 DIAGNOSIS — R10.84 GENERALIZED ABDOMINAL PAIN: Primary | ICD-10-CM

## 2022-05-12 DIAGNOSIS — R11.2 NAUSEA VOMITING AND DIARRHEA: ICD-10-CM

## 2022-05-12 DIAGNOSIS — R19.7 NAUSEA VOMITING AND DIARRHEA: ICD-10-CM

## 2022-05-12 LAB
ALBUMIN SERPL-MCNC: 4.4 GM/DL (ref 3.5–5)
ALBUMIN/GLOB SERPL: 1.4 RATIO (ref 1.1–2)
ALP SERPL-CCNC: 67 UNIT/L (ref 40–150)
ALT SERPL-CCNC: 17 UNIT/L (ref 0–55)
APPEARANCE UR: CLEAR
AST SERPL-CCNC: 18 UNIT/L (ref 5–34)
BACTERIA #/AREA URNS AUTO: NORMAL /HPF
BASOPHILS # BLD AUTO: 0.02 X10(3)/MCL (ref 0–0.2)
BASOPHILS NFR BLD AUTO: 0.3 %
BILIRUB UR QL STRIP.AUTO: NEGATIVE MG/DL
BILIRUBIN DIRECT+TOT PNL SERPL-MCNC: 0.6 MG/DL
BUN SERPL-MCNC: 12.2 MG/DL (ref 8.9–20.6)
CALCIUM SERPL-MCNC: 9.9 MG/DL (ref 8.4–10.2)
CHLORIDE SERPL-SCNC: 105 MMOL/L (ref 98–107)
CO2 SERPL-SCNC: 27 MMOL/L (ref 22–29)
COLOR UR AUTO: YELLOW
CREAT SERPL-MCNC: 0.76 MG/DL (ref 0.73–1.18)
EOSINOPHIL # BLD AUTO: 0.02 X10(3)/MCL (ref 0–0.9)
EOSINOPHIL NFR BLD AUTO: 0.3 %
ERYTHROCYTE [DISTWIDTH] IN BLOOD BY AUTOMATED COUNT: 13.4 % (ref 11.5–17)
GLOBULIN SER-MCNC: 3.2 GM/DL (ref 2.4–3.5)
GLUCOSE SERPL-MCNC: 91 MG/DL (ref 74–100)
GLUCOSE UR QL STRIP.AUTO: NEGATIVE MG/DL
HCT VFR BLD AUTO: 45.1 % (ref 42–52)
HGB BLD-MCNC: 14.4 GM/DL (ref 14–18)
IMM GRANULOCYTES # BLD AUTO: 0.03 X10(3)/MCL (ref 0–0.02)
IMM GRANULOCYTES NFR BLD AUTO: 0.5 % (ref 0–0.43)
KETONES UR QL STRIP.AUTO: ABNORMAL MG/DL
LEUKOCYTE ESTERASE UR QL STRIP.AUTO: NEGATIVE UNIT/L
LIPASE SERPL-CCNC: 11 U/L
LYMPHOCYTES # BLD AUTO: 1.13 X10(3)/MCL (ref 0.6–4.6)
LYMPHOCYTES NFR BLD AUTO: 18.3 %
MCH RBC QN AUTO: 29.6 PG (ref 27–31)
MCHC RBC AUTO-ENTMCNC: 31.9 MG/DL (ref 33–36)
MCV RBC AUTO: 92.8 FL (ref 80–94)
MONOCYTES # BLD AUTO: 0.54 X10(3)/MCL (ref 0.1–1.3)
MONOCYTES NFR BLD AUTO: 8.7 %
NEUTROPHILS # BLD AUTO: 4.4 X10(3)/MCL (ref 2.1–9.2)
NEUTROPHILS NFR BLD AUTO: 71.9 %
NITRITE UR QL STRIP.AUTO: NEGATIVE
NRBC BLD AUTO-RTO: 0 %
PH UR STRIP.AUTO: 5.5 [PH]
PLATELET # BLD AUTO: 311 X10(3)/MCL (ref 130–400)
PMV BLD AUTO: 9.6 FL (ref 9.4–12.4)
POTASSIUM SERPL-SCNC: 4.5 MMOL/L (ref 3.5–5.1)
PROT SERPL-MCNC: 7.6 GM/DL (ref 6.4–8.3)
PROT UR QL STRIP.AUTO: NEGATIVE MG/DL
RBC # BLD AUTO: 4.86 X10(6)/MCL (ref 4.7–6.1)
RBC #/AREA URNS AUTO: NORMAL /HPF
RBC UR QL AUTO: NEGATIVE UNIT/L
SODIUM SERPL-SCNC: 140 MMOL/L (ref 136–145)
SP GR UR STRIP.AUTO: 1.02 (ref 1–1.03)
SQUAMOUS #/AREA URNS AUTO: NORMAL /LPF
UROBILINOGEN UR STRIP-ACNC: 0.2 MG/DL
WBC # SPEC AUTO: 6.2 X10(3)/MCL (ref 4.5–11.5)
WBC #/AREA URNS AUTO: NORMAL /HPF

## 2022-05-12 PROCEDURE — 83690 ASSAY OF LIPASE: CPT | Performed by: NURSE PRACTITIONER

## 2022-05-12 PROCEDURE — 96361 HYDRATE IV INFUSION ADD-ON: CPT

## 2022-05-12 PROCEDURE — 80053 COMPREHEN METABOLIC PANEL: CPT | Performed by: NURSE PRACTITIONER

## 2022-05-12 PROCEDURE — 85025 COMPLETE CBC W/AUTO DIFF WBC: CPT | Performed by: NURSE PRACTITIONER

## 2022-05-12 PROCEDURE — 36415 COLL VENOUS BLD VENIPUNCTURE: CPT | Performed by: NURSE PRACTITIONER

## 2022-05-12 PROCEDURE — 96375 TX/PRO/DX INJ NEW DRUG ADDON: CPT

## 2022-05-12 PROCEDURE — 63600175 PHARM REV CODE 636 W HCPCS: Performed by: NURSE PRACTITIONER

## 2022-05-12 PROCEDURE — 96374 THER/PROPH/DIAG INJ IV PUSH: CPT

## 2022-05-12 PROCEDURE — 81001 URINALYSIS AUTO W/SCOPE: CPT | Performed by: NURSE PRACTITIONER

## 2022-05-12 PROCEDURE — 63600175 PHARM REV CODE 636 W HCPCS: Performed by: PHYSICIAN ASSISTANT

## 2022-05-12 PROCEDURE — 99284 EMERGENCY DEPT VISIT MOD MDM: CPT | Mod: 25

## 2022-05-12 RX ORDER — HYDROCODONE BITARTRATE AND ACETAMINOPHEN 10; 325 MG/1; MG/1
1 TABLET ORAL EVERY 6 HOURS PRN
Qty: 16 TABLET | Refills: 0 | Status: SHIPPED | OUTPATIENT
Start: 2022-05-12 | End: 2022-05-16

## 2022-05-12 RX ORDER — HYDROMORPHONE HYDROCHLORIDE 2 MG/ML
1 INJECTION, SOLUTION INTRAMUSCULAR; INTRAVENOUS; SUBCUTANEOUS
Status: COMPLETED | OUTPATIENT
Start: 2022-05-12 | End: 2022-05-12

## 2022-05-12 RX ORDER — METHYLPREDNISOLONE SOD SUCC 125 MG
125 VIAL (EA) INJECTION
Status: COMPLETED | OUTPATIENT
Start: 2022-05-12 | End: 2022-05-12

## 2022-05-12 RX ORDER — KETOROLAC TROMETHAMINE 30 MG/ML
30 INJECTION, SOLUTION INTRAMUSCULAR; INTRAVENOUS ONCE
Status: DISCONTINUED | OUTPATIENT
Start: 2022-05-12 | End: 2022-05-12

## 2022-05-12 RX ORDER — ONDANSETRON 2 MG/ML
4 INJECTION INTRAMUSCULAR; INTRAVENOUS ONCE
Status: COMPLETED | OUTPATIENT
Start: 2022-05-12 | End: 2022-05-12

## 2022-05-12 RX ORDER — ONDANSETRON 4 MG/1
4 TABLET, FILM COATED ORAL EVERY 6 HOURS PRN
Qty: 20 TABLET | Refills: 0 | Status: SHIPPED | OUTPATIENT
Start: 2022-05-12 | End: 2022-05-17

## 2022-05-12 RX ADMIN — HYDROMORPHONE HYDROCHLORIDE 1 MG: 2 INJECTION INTRAMUSCULAR; INTRAVENOUS; SUBCUTANEOUS at 04:05

## 2022-05-12 RX ADMIN — SODIUM CHLORIDE, POTASSIUM CHLORIDE, SODIUM LACTATE AND CALCIUM CHLORIDE 1000 ML: 600; 310; 30; 20 INJECTION, SOLUTION INTRAVENOUS at 03:05

## 2022-05-12 RX ADMIN — ONDANSETRON 4 MG: 2 INJECTION INTRAMUSCULAR; INTRAVENOUS at 04:05

## 2022-05-12 RX ADMIN — METHYLPREDNISOLONE SODIUM SUCCINATE 125 MG: 125 INJECTION, POWDER, FOR SOLUTION INTRAMUSCULAR; INTRAVENOUS at 04:05

## 2022-05-12 NOTE — FIRST PROVIDER EVALUATION
Medical screening exam completed.  I have conducted a focused provider triage encounter, findings are as follows:    Brief history of present illness:  States abdominal pain, nausea, vomiting, and diarrhea x 4 days. Hx. Of Crohn's.     There were no vitals filed for this visit.    Pertinent physical exam:  Awake, alert, ambulatory    Brief workup plan:  labs    Preliminary workup initiated; this workup will be continued and followed by the physician or advanced practice provider that is assigned to the patient when roomed.

## 2022-05-12 NOTE — DISCHARGE INSTRUCTIONS
Drink plenty of drink plenty of fluids.  Use Zofran for nausea vomiting.  Take Norco for severe pain.  Do not drink or drive while taking narcotics as the sedating.  He

## 2022-05-13 NOTE — ED PROVIDER NOTES
Encounter Date: 5/12/2022       History     Chief Complaint   Patient presents with    Abdominal Pain     Pt presents to er c/o abd pain with n/v and diarrhea since Sunday.  States hx of chrons disease.  Low grade fever on Sunday and Monday.  States feels very dehydrated.  Pt is aaox4 ambulated into room with steady gait.      46-year-old male with history of Crohn's presents to ED for evaluation of generalized abdominal pain with nausea vomiting and diarrhea since Sunday.  Patient reports to being out restaurant prior to symptoms starting.  States mother has same symptoms.  Patient reports he feels dehydrated.  Seizure this is not feel like his normal Crohn's flare.  Denies any blood in his stool.  Intermittent low-grade temp.        Review of patient's allergies indicates:   Allergen Reactions    Ketorolac Swelling    Nalbuphine     Dicyclomine Rash     Other reaction(s): decrease in LOC    Tramadol-acetaminophen Rash     Past Medical History:   Diagnosis Date    Crohn disease     Hypertension      Past Surgical History:   Procedure Laterality Date    BOWEL RESECTION      CHOLECYSTECTOMY       No family history on file.  Social History     Tobacco Use    Smoking status: Never Smoker    Smokeless tobacco: Never Used   Substance Use Topics    Alcohol use: Not Currently    Drug use: Never     Review of Systems   Constitutional: Negative for chills, diaphoresis and fever.   Respiratory: Negative for shortness of breath.    Cardiovascular: Negative for chest pain.   Gastrointestinal: Positive for abdominal pain, diarrhea, nausea and vomiting. Negative for constipation.   Genitourinary: Negative for dysuria, flank pain, frequency and urgency.   Musculoskeletal: Negative for back pain.   Skin: Negative for rash.   Neurological: Negative for dizziness and weakness.   Hematological: Does not bruise/bleed easily.       Physical Exam     Initial Vitals [05/12/22 1149]   BP Pulse Resp Temp SpO2   119/78 96 18  97.3 °F (36.3 °C) 100 %      MAP       --         Physical Exam    Nursing note and vitals reviewed.  Constitutional: He is cooperative.   HENT:   Head: Normocephalic and atraumatic.   Eyes: Conjunctivae and EOM are normal. Pupils are equal, round, and reactive to light.   Neck: Neck supple.   Normal range of motion.  Cardiovascular: Normal rate, regular rhythm and normal heart sounds.   Pulmonary/Chest: Breath sounds normal. No respiratory distress. He has no wheezes. He has no rhonchi. He has no rales.   Abdominal: Abdomen is soft. Bowel sounds are normal. There is abdominal tenderness (generalized).   Musculoskeletal:      Cervical back: Normal range of motion and neck supple.     Neurological: He is alert and oriented to person, place, and time.   Skin: Skin is warm and dry.   Psychiatric: He has a normal mood and affect.         ED Course   Procedures  Labs Reviewed   URINALYSIS, REFLEX TO URINE CULTURE - Abnormal; Notable for the following components:       Result Value    Ketones, UA Trace (*)     All other components within normal limits   CBC WITH DIFFERENTIAL - Abnormal; Notable for the following components:    MCHC 31.9 (*)     IG# 0.03 (*)     IG% 0.5 (*)     All other components within normal limits   LIPASE - Normal   URINALYSIS, MICROSCOPIC - Normal   COMPREHENSIVE METABOLIC PANEL   CBC W/ AUTO DIFFERENTIAL    Narrative:     The following orders were created for panel order CBC Auto Differential.  Procedure                               Abnormality         Status                     ---------                               -----------         ------                     CBC with Differential[[id number]]        Abnormal            Final result                 Please view results for these tests on the individual orders.          Imaging Results    None          Medications   ondansetron injection 4 mg (4 mg Intravenous Given 5/12/22 1610)   lactated ringers bolus 1,000 mL (0 mLs Intravenous Stopped  5/12/22 1630)   HYDROmorphone (PF) injection 1 mg (1 mg Intravenous Given 5/12/22 1611)   methylPREDNISolone sodium succinate injection 125 mg (125 mg Intravenous Given 5/12/22 1636)     Medical Decision Making:   Differential Diagnosis:   Reassessed the pt.  The pt is resting comfortably and is NAD.  Pt states their sx have improved after IV meds and fluids. Reviewed normal labs. Discussed no indication for CT at this time, patient in agreement. Discussed test results, shared treatment plan, specific conditions for return, and the need for f/u. Answered their questions at this time.  Pt understands and agrees to the plan.  The pt has remained hemodynamically stable through ED course and is stable for discharge.                         Clinical Impression:   Final diagnoses:  [R10.84] Generalized abdominal pain (Primary)  [R11.2, R19.7] Nausea vomiting and diarrhea          ED Disposition Condition    Discharge Stable        ED Prescriptions     Medication Sig Dispense Start Date End Date Auth. Provider    ondansetron (ZOFRAN) 4 MG tablet Take 1 tablet (4 mg total) by mouth every 6 (six) hours as needed for Nausea. 20 tablet 5/12/2022 5/17/2022 CHLOE Bhatia    HYDROcodone-acetaminophen (NORCO)  mg per tablet Take 1 tablet by mouth every 6 (six) hours as needed for Pain. 16 tablet 5/12/2022 5/16/2022 CHLOE Bhatia        Follow-up Information     Follow up With Specialties Details Why Contact Info    PCP  In 1 week As needed            CHLOE Bhatia  05/12/22 2849

## 2022-07-10 ENCOUNTER — HOSPITAL ENCOUNTER (EMERGENCY)
Facility: HOSPITAL | Age: 47
Discharge: HOME | End: 2022-07-10
Attending: STUDENT IN AN ORGANIZED HEALTH CARE EDUCATION/TRAINING PROGRAM
Payer: MEDICARE

## 2022-07-10 VITALS
RESPIRATION RATE: 12 BRPM | SYSTOLIC BLOOD PRESSURE: 138 MMHG | DIASTOLIC BLOOD PRESSURE: 89 MMHG | OXYGEN SATURATION: 98 % | BODY MASS INDEX: 25.06 KG/M2 | HEART RATE: 105 BPM | TEMPERATURE: 100 F | WEIGHT: 160 LBS

## 2022-07-10 DIAGNOSIS — R10.33 PERIUMBILICAL ABDOMINAL PAIN: ICD-10-CM

## 2022-07-10 DIAGNOSIS — K50.919 CROHN'S DISEASE WITH COMPLICATION, UNSPECIFIED GASTROINTESTINAL TRACT LOCATION: Primary | ICD-10-CM

## 2022-07-10 DIAGNOSIS — R10.9 ABDOMINAL PAIN, UNSPECIFIED ABDOMINAL LOCATION: ICD-10-CM

## 2022-07-10 DIAGNOSIS — S05.02XA ABRASION OF LEFT CORNEA, INITIAL ENCOUNTER: ICD-10-CM

## 2022-07-10 DIAGNOSIS — R11.2 NON-INTRACTABLE VOMITING WITH NAUSEA, UNSPECIFIED VOMITING TYPE: ICD-10-CM

## 2022-07-10 LAB
ABS NEUT (OLG): 4.72 X10(3)/MCL (ref 2.1–9.2)
ALBUMIN SERPL-MCNC: 4 GM/DL (ref 3.5–5)
ALBUMIN/GLOB SERPL: 1.2 RATIO (ref 1.1–2)
ALP SERPL-CCNC: 73 UNIT/L (ref 40–150)
ALT SERPL-CCNC: 16 UNIT/L (ref 0–55)
APPEARANCE UR: CLEAR
AST SERPL-CCNC: 17 UNIT/L (ref 5–34)
BACTERIA #/AREA URNS AUTO: NORMAL /HPF
BILIRUB UR QL STRIP.AUTO: NEGATIVE MG/DL
BILIRUBIN DIRECT+TOT PNL SERPL-MCNC: 0.3 MG/DL
BUN SERPL-MCNC: 9.6 MG/DL (ref 8.9–20.6)
CALCIUM SERPL-MCNC: 9.9 MG/DL (ref 8.4–10.2)
CHLORIDE SERPL-SCNC: 101 MMOL/L (ref 98–107)
CO2 SERPL-SCNC: 25 MMOL/L (ref 22–29)
COLOR UR AUTO: YELLOW
CREAT SERPL-MCNC: 0.69 MG/DL (ref 0.73–1.18)
CRP SERPL-MCNC: 67.8 MG/L
ERYTHROCYTE [DISTWIDTH] IN BLOOD BY AUTOMATED COUNT: 12.8 % (ref 11.5–17)
ERYTHROCYTE [SEDIMENTATION RATE] IN BLOOD: 33 MM/HR (ref 0–15)
GLOBULIN SER-MCNC: 3.4 GM/DL (ref 2.4–3.5)
GLUCOSE SERPL-MCNC: 105 MG/DL (ref 74–100)
GLUCOSE UR QL STRIP.AUTO: NEGATIVE MG/DL
HCT VFR BLD AUTO: 42.6 % (ref 42–52)
HGB BLD-MCNC: 13.9 GM/DL (ref 14–18)
IMM GRANULOCYTES # BLD AUTO: 0.01 X10(3)/MCL (ref 0–0.04)
IMM GRANULOCYTES NFR BLD AUTO: 0.2 %
INSTRUMENT WBC (OLG): 6.3 X10(3)/MCL
KETONES UR QL STRIP.AUTO: ABNORMAL MG/DL
LEUKOCYTE ESTERASE UR QL STRIP.AUTO: NEGATIVE UNIT/L
LIPASE SERPL-CCNC: 14 U/L
LYMPHOCYTES NFR BLD MANUAL: 0.57 X10(3)/MCL
LYMPHOCYTES NFR BLD MANUAL: 9 %
MCH RBC QN AUTO: 29 PG (ref 27–31)
MCHC RBC AUTO-ENTMCNC: 32.6 MG/DL (ref 33–36)
MCV RBC AUTO: 88.8 FL (ref 80–94)
MONOCYTES NFR BLD MANUAL: 1.07 X10(3)/MCL (ref 0.1–1.3)
MONOCYTES NFR BLD MANUAL: 17 %
NEUTROPHILS NFR BLD MANUAL: 75 %
NITRITE UR QL STRIP.AUTO: NEGATIVE
NRBC BLD AUTO-RTO: 0 %
PH UR STRIP.AUTO: 6 [PH]
PLATELET # BLD AUTO: 276 X10(3)/MCL (ref 130–400)
PLATELET # BLD EST: NORMAL 10*3/UL
PMV BLD AUTO: 9.7 FL (ref 7.4–10.4)
POTASSIUM SERPL-SCNC: 3.2 MMOL/L (ref 3.5–5.1)
PROT SERPL-MCNC: 7.4 GM/DL (ref 6.4–8.3)
PROT UR QL STRIP.AUTO: ABNORMAL MG/DL
RBC # BLD AUTO: 4.8 X10(6)/MCL (ref 4.7–6.1)
RBC #/AREA URNS AUTO: <5 /HPF
RBC MORPH BLD: NORMAL
RBC UR QL AUTO: NEGATIVE UNIT/L
SODIUM SERPL-SCNC: 137 MMOL/L (ref 136–145)
SP GR UR STRIP.AUTO: >=1.04 (ref 1–1.03)
SQUAMOUS #/AREA URNS AUTO: <5 /HPF
UROBILINOGEN UR STRIP-ACNC: 0.2 MG/DL
WBC # SPEC AUTO: 6.3 X10(3)/MCL (ref 4.5–11.5)
WBC #/AREA URNS AUTO: <5 /HPF

## 2022-07-10 PROCEDURE — 63600175 PHARM REV CODE 636 W HCPCS: Performed by: STUDENT IN AN ORGANIZED HEALTH CARE EDUCATION/TRAINING PROGRAM

## 2022-07-10 PROCEDURE — 81001 URINALYSIS AUTO W/SCOPE: CPT | Performed by: PHYSICIAN ASSISTANT

## 2022-07-10 PROCEDURE — 80053 COMPREHEN METABOLIC PANEL: CPT | Performed by: PHYSICIAN ASSISTANT

## 2022-07-10 PROCEDURE — 96361 HYDRATE IV INFUSION ADD-ON: CPT

## 2022-07-10 PROCEDURE — 96376 TX/PRO/DX INJ SAME DRUG ADON: CPT

## 2022-07-10 PROCEDURE — 85025 COMPLETE CBC W/AUTO DIFF WBC: CPT | Performed by: PHYSICIAN ASSISTANT

## 2022-07-10 PROCEDURE — 36415 COLL VENOUS BLD VENIPUNCTURE: CPT | Performed by: PHYSICIAN ASSISTANT

## 2022-07-10 PROCEDURE — 63600175 PHARM REV CODE 636 W HCPCS: Performed by: PHYSICIAN ASSISTANT

## 2022-07-10 PROCEDURE — 99285 EMERGENCY DEPT VISIT HI MDM: CPT | Mod: 25

## 2022-07-10 PROCEDURE — 25500020 PHARM REV CODE 255: Performed by: STUDENT IN AN ORGANIZED HEALTH CARE EDUCATION/TRAINING PROGRAM

## 2022-07-10 PROCEDURE — 36415 COLL VENOUS BLD VENIPUNCTURE: CPT | Performed by: STUDENT IN AN ORGANIZED HEALTH CARE EDUCATION/TRAINING PROGRAM

## 2022-07-10 PROCEDURE — 96374 THER/PROPH/DIAG INJ IV PUSH: CPT | Mod: 59

## 2022-07-10 PROCEDURE — 86140 C-REACTIVE PROTEIN: CPT | Performed by: STUDENT IN AN ORGANIZED HEALTH CARE EDUCATION/TRAINING PROGRAM

## 2022-07-10 PROCEDURE — 83690 ASSAY OF LIPASE: CPT | Performed by: PHYSICIAN ASSISTANT

## 2022-07-10 PROCEDURE — 85651 RBC SED RATE NONAUTOMATED: CPT | Performed by: STUDENT IN AN ORGANIZED HEALTH CARE EDUCATION/TRAINING PROGRAM

## 2022-07-10 PROCEDURE — 25000003 PHARM REV CODE 250: Performed by: STUDENT IN AN ORGANIZED HEALTH CARE EDUCATION/TRAINING PROGRAM

## 2022-07-10 PROCEDURE — 96375 TX/PRO/DX INJ NEW DRUG ADDON: CPT

## 2022-07-10 RX ORDER — POTASSIUM CHLORIDE 20 MEQ/1
40 TABLET, EXTENDED RELEASE ORAL
Status: COMPLETED | OUTPATIENT
Start: 2022-07-10 | End: 2022-07-10

## 2022-07-10 RX ORDER — PROPARACAINE HYDROCHLORIDE 5 MG/ML
1 SOLUTION/ DROPS OPHTHALMIC
Status: DISCONTINUED | OUTPATIENT
Start: 2022-07-10 | End: 2022-07-10 | Stop reason: HOSPADM

## 2022-07-10 RX ORDER — METHYLPREDNISOLONE SOD SUCC 125 MG
125 VIAL (EA) INJECTION
Status: COMPLETED | OUTPATIENT
Start: 2022-07-10 | End: 2022-07-10

## 2022-07-10 RX ORDER — PROMETHAZINE HYDROCHLORIDE 12.5 MG/1
25 TABLET ORAL EVERY 6 HOURS PRN
Qty: 15 TABLET | Refills: 0 | Status: SHIPPED | OUTPATIENT
Start: 2022-07-10 | End: 2022-07-15

## 2022-07-10 RX ORDER — LEVOFLOXACIN 5 MG/ML
2 SOLUTION OPHTHALMIC
Qty: 5 ML | Refills: 0 | Status: SHIPPED | OUTPATIENT
Start: 2022-07-10 | End: 2022-07-15

## 2022-07-10 RX ORDER — HYDROMORPHONE HYDROCHLORIDE 2 MG/ML
1 INJECTION, SOLUTION INTRAMUSCULAR; INTRAVENOUS; SUBCUTANEOUS
Status: COMPLETED | OUTPATIENT
Start: 2022-07-10 | End: 2022-07-10

## 2022-07-10 RX ORDER — DROPERIDOL 2.5 MG/ML
1.25 INJECTION, SOLUTION INTRAMUSCULAR; INTRAVENOUS
Status: COMPLETED | OUTPATIENT
Start: 2022-07-10 | End: 2022-07-10

## 2022-07-10 RX ORDER — ONDANSETRON 2 MG/ML
4 INJECTION INTRAMUSCULAR; INTRAVENOUS
Status: COMPLETED | OUTPATIENT
Start: 2022-07-10 | End: 2022-07-10

## 2022-07-10 RX ORDER — PREDNISONE 10 MG/1
TABLET ORAL
Qty: 70 TABLET | Refills: 0 | Status: SHIPPED | OUTPATIENT
Start: 2022-07-10 | End: 2022-08-07

## 2022-07-10 RX ORDER — HYDROCODONE BITARTRATE AND ACETAMINOPHEN 10; 325 MG/1; MG/1
1 TABLET ORAL EVERY 8 HOURS PRN
Qty: 15 TABLET | Refills: 0 | Status: SHIPPED | OUTPATIENT
Start: 2022-07-10 | End: 2022-07-15

## 2022-07-10 RX ADMIN — HYDROMORPHONE HYDROCHLORIDE 1 MG: 2 INJECTION, SOLUTION INTRAMUSCULAR; INTRAVENOUS; SUBCUTANEOUS at 01:07

## 2022-07-10 RX ADMIN — POTASSIUM CHLORIDE 40 MEQ: 1500 TABLET, EXTENDED RELEASE ORAL at 01:07

## 2022-07-10 RX ADMIN — IOPAMIDOL 100 ML: 755 INJECTION, SOLUTION INTRAVENOUS at 12:07

## 2022-07-10 RX ADMIN — DROPERIDOL 1.25 MG: 2.5 INJECTION, SOLUTION INTRAMUSCULAR; INTRAVENOUS at 01:07

## 2022-07-10 RX ADMIN — HYDROMORPHONE HYDROCHLORIDE 1 MG: 2 INJECTION, SOLUTION INTRAMUSCULAR; INTRAVENOUS; SUBCUTANEOUS at 11:07

## 2022-07-10 RX ADMIN — SODIUM CHLORIDE, POTASSIUM CHLORIDE, SODIUM LACTATE AND CALCIUM CHLORIDE 1000 ML: 600; 310; 30; 20 INJECTION, SOLUTION INTRAVENOUS at 01:07

## 2022-07-10 RX ADMIN — ONDANSETRON 4 MG: 2 INJECTION INTRAMUSCULAR; INTRAVENOUS at 11:07

## 2022-07-10 RX ADMIN — SODIUM CHLORIDE, POTASSIUM CHLORIDE, SODIUM LACTATE AND CALCIUM CHLORIDE 1000 ML: 600; 310; 30; 20 INJECTION, SOLUTION INTRAVENOUS at 11:07

## 2022-07-10 RX ADMIN — METHYLPREDNISOLONE SODIUM SUCCINATE 125 MG: 125 INJECTION, POWDER, FOR SOLUTION INTRAMUSCULAR; INTRAVENOUS at 02:07

## 2022-07-10 NOTE — ED PROVIDER NOTES
Encounter Date: 7/10/2022    SCRIBE #1 NOTE: I, Haider Helton, am scribing for, and in the presence of,  Vinay Capone MD. I have scribed the following portions of the note - Other sections scribed: HPI, ROS, physical exam.       History     Chief Complaint   Patient presents with    Abdominal Pain    Vomiting    Diarrhea     Pt presents c/o crohns flare up with diarrhea/abd pain/ vomiting. Onset x 3 days.  Also states pink eye.       48 y/o male with a history of Crohn's and HTN presenting to the ED for n/v/d and generalized abdominal pain onset 3 days ago. Pt also c/o L eye pain and redness but denies any associated fever or blood in stool. He states he does sometimes have eye pain associated with flareups. He states he has not been taking anything for the pain. Pt states he has an appointment next month but is unsure who it is with. He states he was established with a gastroenterologist but then moved to [place] and has not been re-established since returning.     The history is provided by the patient. No  was used.   Abdominal Pain  Illness onset: 3 days ago. The abdominal pain is generalized. The abdominal pain does not radiate. The abdominal pain is relieved by nothing. The other symptoms of the illness include nausea, vomiting and diarrhea. The other symptoms of the illness do not include fever, shortness of breath, dysuria or hematochezia.   The patient states that she believes she is currently not pregnant. Risk factors for an acute abdominal problem include a history of abdominal surgery (Crohn's disease).     Review of patient's allergies indicates:   Allergen Reactions    Ketorolac Swelling    Nalbuphine     Dicyclomine Rash     Other reaction(s): decrease in LOC    Tramadol-acetaminophen Rash     Past Medical History:   Diagnosis Date    Crohn disease     Hypertension      Past Surgical History:   Procedure Laterality Date    BOWEL RESECTION      CHOLECYSTECTOMY       No  family history on file.  Social History     Tobacco Use    Smoking status: Never Smoker    Smokeless tobacco: Never Used   Substance Use Topics    Alcohol use: Not Currently    Drug use: Never     Review of Systems   Constitutional: Negative for fever.   HENT: Negative for sore throat.    Eyes: Positive for pain (L) and redness (L). Negative for visual disturbance.   Respiratory: Negative for shortness of breath.    Cardiovascular: Negative for chest pain.   Gastrointestinal: Positive for abdominal pain, diarrhea, nausea and vomiting. Negative for blood in stool and hematochezia.   Genitourinary: Negative for dysuria.   Musculoskeletal: Negative for joint swelling.   Skin: Negative for rash.   Neurological: Negative for weakness.   Psychiatric/Behavioral: Negative for confusion.   All other systems reviewed and are negative.      Physical Exam     Initial Vitals [07/10/22 0907]   BP Pulse Resp Temp SpO2   112/72 (!) 113 18 99.5 °F (37.5 °C) 97 %      MAP       --         Physical Exam    Nursing note and vitals reviewed.  Constitutional: He appears well-developed and well-nourished. He is not diaphoretic. No distress.   HENT:   Head: Normocephalic and atraumatic.   Eyes: EOM are normal. Pupils are equal, round, and reactive to light. Left conjunctiva is injected.   Slit lamp exam:       The right eye shows corneal abrasion (2mm abrasion at 9 o clock position).   L eye tearing, L IOP 20.0 mmHg   Neck:   Normal range of motion.  Cardiovascular: Normal rate, regular rhythm, normal heart sounds and intact distal pulses.   No murmur heard.  Pulmonary/Chest: Breath sounds normal. No respiratory distress. He has no wheezes. He has no rales.   Abdominal: Abdomen is soft. He exhibits no distension. There is generalized abdominal tenderness.   Midline previous surgical incision site. Previous surgical incision site to RUQ.   Musculoskeletal:         General: No tenderness or edema. Normal range of motion.      Cervical  back: Normal range of motion.     Neurological: He is alert and oriented to person, place, and time. No cranial nerve deficit.   Skin: Skin is warm and dry. Capillary refill takes less than 2 seconds. No rash noted. No erythema.   Psychiatric: He has a normal mood and affect.         ED Course   Procedures  Labs Reviewed   COMPREHENSIVE METABOLIC PANEL - Abnormal; Notable for the following components:       Result Value    Potassium Level 3.2 (*)     Glucose Level 105 (*)     Creatinine 0.69 (*)     All other components within normal limits   URINALYSIS, REFLEX TO URINE CULTURE - Abnormal; Notable for the following components:    Specific Gravity, UA >=1.040 (*)     Protein, UA Trace (*)     Ketones, UA Trace (*)     All other components within normal limits   CBC WITH DIFFERENTIAL - Abnormal; Notable for the following components:    Hgb 13.9 (*)     MCHC 32.6 (*)     All other components within normal limits   MANUAL DIFFERENTIAL - Abnormal; Notable for the following components:    Abs Lymp 0.567 (*)     All other components within normal limits   SEDIMENTATION RATE, AUTOMATED - Abnormal; Notable for the following components:    Sed Rate 33 (*)     All other components within normal limits   C-REACTIVE PROTEIN - Abnormal; Notable for the following components:    C-Reactive Protein 67.80 (*)     All other components within normal limits   LIPASE - Normal   URINALYSIS, MICROSCOPIC - Normal   CBC W/ AUTO DIFFERENTIAL    Narrative:     The following orders were created for panel order CBC auto differential.  Procedure                               Abnormality         Status                     ---------                               -----------         ------                     CBC with Differential[[id number]]        Abnormal            Final result               Manual Differential[[id number]]          Abnormal            Final result                 Please view results for these tests on the individual orders.           Imaging Results          CT Abdomen Pelvis With Contrast (Final result)  Result time 07/10/22 12:42:17    Final result by Leah Rodriguez MD (07/10/22 12:42:17)                 Impression:      1. No acute abnormality of the abdomen or pelvis.  2. Postoperative changes of the bowel without focal inflammatory changes, fluid collection or obstruction.  3. Large colonic stool volume.  4. Bilateral nonobstructing renal calculi.      Electronically signed by: Leah Rodriguez  Date:    07/10/2022  Time:    12:42             Narrative:    EXAMINATION:  CT ABDOMEN PELVIS WITH CONTRAST    CLINICAL HISTORY:  Abdominal pain, acute, nonlocalized;Crohn's flare, abdominal pain;    TECHNIQUE:  CT imaging was performed of the abdomen and pelvis after the administration of intravenous contrast. Dose length product is 289 mGycm. Automatic exposure control, adjustment of mA/kV or iterative reconstruction technique was used to limit radiation dose.    COMPARISON:  CT abdomen pelvis dated 03/07/2022    FINDINGS:  Liver: There is a stable 9 mm enhancing nodule in the right hepatic lobe (series 2, image 25).    Gallbladder and biliary tree: The gallbladder has been surgically resected.  No intra or extrahepatic biliary ductal dilation.    Pancreas: Normal.    Spleen: Punctate splenic granulomas are noted.    Adrenals: Normal.    Kidneys and ureters: Right renal scarring is noted.  There are bilateral nonobstructing renal calculi.  There is no hydronephrosis.    Bladder: Normal.    Reproductive organs: No pelvic masses.    Stomach/bowel: There are postoperative changes of the bowel.  There is no definite focal bowel wall thickening or inflammatory change.  There is a large volume of stool in the rectum.  There is no bowel obstruction.    Lymph nodes: No pathologically enlarged lymph node identified.    Peritoneum: No ascites or free air. No fluid collection.    Vessels: No abdominal aortic aneurysm.    Abdominal wall:  Normal.    Lung bases: No consolidation or pleural effusion.    Bones: No acute osseous findings.                                 Medications   ondansetron injection 4 mg (4 mg Intravenous Given 7/10/22 1129)   HYDROmorphone (PF) injection 1 mg (1 mg Intravenous Given 7/10/22 1129)   lactated ringers bolus 1,000 mL (0 mLs Intravenous Stopped 7/10/22 1229)   iopamidoL (ISOVUE-370) injection 100 mL (100 mLs Intravenous Given 7/10/22 1230)   potassium chloride SA CR tablet 40 mEq (40 mEq Oral Given 7/10/22 1309)   HYDROmorphone (PF) injection 1 mg (1 mg Intravenous Given 7/10/22 1309)   droperidoL injection 1.25 mg (1.25 mg Intravenous Given 7/10/22 1310)   lactated ringers bolus 1,000 mL (0 mLs Intravenous Stopped 7/10/22 1400)   methylPREDNISolone sodium succinate injection 125 mg (125 mg Intravenous Given 7/10/22 1445)     Medical Decision Making:   Clinical Tests:   Lab Tests: Ordered and Reviewed  ED Management:  Patient is a 47 male presents for abdominal pain.  Hx of Crohn's not currently on any medications.  See HPI/exam.  Labs as noted.  Pain/nausea controlled with IV fluids/zofran/dilaudid.  Hx of Crohn's reports similar to previous flare.  CT abd/pelvis obtained without evidence of SBO/fistula/or other acute process.   L eye with abrasion noted.  Will rx abx medication.  Discussed need for f/u with eye doctor in 1-2 days.  Discussed need for f/u with PCP/GI.  Will rx steroid course.  Reassessed patient.  Patient is resting comfortably.  Discussed all results.  Discussed need for follow-up.  Discussed return precautions.  Answered all questions at this time.  Hemodynamically stable for continued outpatient management with strict return precautions.  Patient verbalized understanding agreed to plan.             Scribe Attestation:   Scribe #1: I performed the above scribed service and the documentation accurately describes the services I performed. I attest to the accuracy of the note.    Attending Attestation:            Physician Attestation for Scribe:  Physician Attestation Statement for Scribe #1: I, Vinay Capone MD, reviewed documentation, as scribed by Haider Helton in my presence, and it is both accurate and complete.                      Clinical Impression:   Final diagnoses:  [K50.919] Crohn's disease with complication, unspecified gastrointestinal tract location (Primary)  [R10.9] Abdominal pain, unspecified abdominal location  [R11.2] Non-intractable vomiting with nausea, unspecified vomiting type  [R10.33] Periumbilical abdominal pain  [S05.02XA] Abrasion of left cornea, initial encounter          ED Disposition Condition    Discharge Stable        ED Prescriptions     Medication Sig Dispense Start Date End Date Auth. Provider    predniSONE (DELTASONE) 10 MG tablet Take 4 tablets (40 mg total) by mouth once daily for 7 days, THEN 3 tablets (30 mg total) once daily for 7 days, THEN 2 tablets (20 mg total) once daily for 7 days, THEN 1 tablet (10 mg total) once daily for 7 days. 70 tablet 7/10/2022 2022 Vinay Capone MD    HYDROcodone-acetaminophen (NORCO)  mg per tablet () Take 1 tablet by mouth every 8 (eight) hours as needed for Pain. 15 tablet 7/10/2022 7/15/2022 Vinay Capone MD    promethazine (PHENERGAN) 12.5 MG Tab () Take 2 tablets (25 mg total) by mouth every 6 (six) hours as needed for Nausea (vomiting). 15 tablet 7/10/2022 7/15/2022 Vinay Capone MD    levoFLOXacin (QUIXIN) 0.5 % ophthalmic solution () Place 2 drops into the left eye every 3 (three) hours. for 5 days 5 mL 7/10/2022 7/15/2022 Vinay Capone MD        Follow-up Information     Follow up With Specialties Details Why Contact Info    Your primary care physician.        Your eye doctor.        Your gastroenterologist.               Vinay Capone MD  [phone]       Vinay Capone MD  [phone]

## 2022-07-10 NOTE — FIRST PROVIDER EVALUATION
Medical screening exam completed.  I have conducted a focused provider triage encounter, findings are as follows:    Brief history of present illness:  47-year-old male with history of Crohn's presents to ED for evaluation of abdominal pain nausea vomiting over the last several days.    Vitals:    07/10/22 0907   BP: 112/72   Pulse: (!) 113   Resp: 18   Temp: 99.5 °F (37.5 °C)   TempSrc: Oral   SpO2: 97%   Weight: 72.6 kg (160 lb)       Pertinent physical exam:  Awake alert and oriented.    Brief workup plan:  Labs, UA, IV fluids, Zofran    Preliminary workup initiated; this workup will be continued and followed by the physician or advanced practice provider that is assigned to the patient when roomed.

## 2022-07-10 NOTE — DISCHARGE INSTRUCTIONS
Use antibiotic eye drop as prescribed.      Take your medications as prescribed    Follow up with your gastroenterologist.     Return to the emergency department if any worsening pain, nausea, vomiting, difficulty breathing, or any other symptoms.

## 2023-07-15 ENCOUNTER — HOSPITAL ENCOUNTER (EMERGENCY)
Facility: HOSPITAL | Age: 48
Discharge: LEFT BEFORE DISCHARGE | End: 2023-07-15
Payer: MEDICARE

## 2023-07-15 VITALS
RESPIRATION RATE: 17 BRPM | TEMPERATURE: 99 F | OXYGEN SATURATION: 100 % | HEART RATE: 101 BPM | SYSTOLIC BLOOD PRESSURE: 148 MMHG | DIASTOLIC BLOOD PRESSURE: 86 MMHG

## 2023-07-15 LAB
ALBUMIN SERPL-MCNC: 4.2 G/DL (ref 3.5–5)
ALBUMIN/GLOB SERPL: 1.2 RATIO (ref 1.1–2)
ALP SERPL-CCNC: 63 UNIT/L (ref 40–150)
ALT SERPL-CCNC: 15 UNIT/L (ref 0–55)
APPEARANCE UR: CLEAR
AST SERPL-CCNC: 18 UNIT/L (ref 5–34)
BACTERIA #/AREA URNS AUTO: NORMAL /HPF
BASOPHILS # BLD AUTO: 0.01 X10(3)/MCL
BASOPHILS NFR BLD AUTO: 0.1 %
BILIRUB UR QL STRIP.AUTO: NEGATIVE
BILIRUBIN DIRECT+TOT PNL SERPL-MCNC: 0.5 MG/DL
BUN SERPL-MCNC: 15.3 MG/DL (ref 8.9–20.6)
CALCIUM SERPL-MCNC: 9.4 MG/DL (ref 8.4–10.2)
CHLORIDE SERPL-SCNC: 106 MMOL/L (ref 98–107)
CO2 SERPL-SCNC: 19 MMOL/L (ref 22–29)
COLOR UR: YELLOW
CREAT SERPL-MCNC: 1.01 MG/DL (ref 0.73–1.18)
EOSINOPHIL # BLD AUTO: 0 X10(3)/MCL (ref 0–0.9)
EOSINOPHIL NFR BLD AUTO: 0 %
ERYTHROCYTE [DISTWIDTH] IN BLOOD BY AUTOMATED COUNT: 13.3 % (ref 11.5–17)
GFR SERPLBLD CREATININE-BSD FMLA CKD-EPI: >60 MLS/MIN/1.73/M2
GLOBULIN SER-MCNC: 3.4 GM/DL (ref 2.4–3.5)
GLUCOSE SERPL-MCNC: 173 MG/DL (ref 74–100)
GLUCOSE UR QL STRIP.AUTO: ABNORMAL
HCT VFR BLD AUTO: 39 % (ref 42–52)
HGB BLD-MCNC: 13 G/DL (ref 14–18)
IMM GRANULOCYTES # BLD AUTO: 0.02 X10(3)/MCL (ref 0–0.04)
IMM GRANULOCYTES NFR BLD AUTO: 0.3 %
KETONES UR QL STRIP.AUTO: NEGATIVE
LEUKOCYTE ESTERASE UR QL STRIP.AUTO: NEGATIVE
LIPASE SERPL-CCNC: 8 U/L
LYMPHOCYTES # BLD AUTO: 0.4 X10(3)/MCL (ref 0.6–4.6)
LYMPHOCYTES NFR BLD AUTO: 5.9 %
MCH RBC QN AUTO: 29.9 PG (ref 27–31)
MCHC RBC AUTO-ENTMCNC: 33.3 G/DL (ref 33–36)
MCV RBC AUTO: 89.7 FL (ref 80–94)
MONOCYTES # BLD AUTO: 0.1 X10(3)/MCL (ref 0.1–1.3)
MONOCYTES NFR BLD AUTO: 1.5 %
NEUTROPHILS # BLD AUTO: 6.2 X10(3)/MCL (ref 2.1–9.2)
NEUTROPHILS NFR BLD AUTO: 92.2 %
NITRITE UR QL STRIP.AUTO: NEGATIVE
NRBC BLD AUTO-RTO: 0 %
PH UR STRIP.AUTO: 5.5 [PH]
PLATELET # BLD AUTO: 282 X10(3)/MCL (ref 130–400)
PMV BLD AUTO: 9.4 FL (ref 7.4–10.4)
POTASSIUM SERPL-SCNC: 3.8 MMOL/L (ref 3.5–5.1)
PROT SERPL-MCNC: 7.6 GM/DL (ref 6.4–8.3)
PROT UR QL STRIP.AUTO: NEGATIVE
RBC # BLD AUTO: 4.35 X10(6)/MCL (ref 4.7–6.1)
RBC #/AREA URNS AUTO: <5 /HPF
RBC UR QL AUTO: NEGATIVE
SODIUM SERPL-SCNC: 138 MMOL/L (ref 136–145)
SP GR UR STRIP.AUTO: 1.03 (ref 1–1.03)
SQUAMOUS #/AREA URNS AUTO: <5 /HPF
UROBILINOGEN UR STRIP-ACNC: 0.2
WBC # SPEC AUTO: 6.73 X10(3)/MCL (ref 4.5–11.5)
WBC #/AREA URNS AUTO: <5 /HPF

## 2023-07-15 PROCEDURE — 99284 EMERGENCY DEPT VISIT MOD MDM: CPT | Mod: 25

## 2023-07-15 PROCEDURE — 80053 COMPREHEN METABOLIC PANEL: CPT | Performed by: NURSE PRACTITIONER

## 2023-07-15 PROCEDURE — 83690 ASSAY OF LIPASE: CPT | Performed by: NURSE PRACTITIONER

## 2023-07-15 PROCEDURE — 25000003 PHARM REV CODE 250: Performed by: NURSE PRACTITIONER

## 2023-07-15 PROCEDURE — 81001 URINALYSIS AUTO W/SCOPE: CPT | Performed by: NURSE PRACTITIONER

## 2023-07-15 PROCEDURE — 96361 HYDRATE IV INFUSION ADD-ON: CPT

## 2023-07-15 PROCEDURE — 96374 THER/PROPH/DIAG INJ IV PUSH: CPT

## 2023-07-15 PROCEDURE — 63600175 PHARM REV CODE 636 W HCPCS: Performed by: NURSE PRACTITIONER

## 2023-07-15 PROCEDURE — 85025 COMPLETE CBC W/AUTO DIFF WBC: CPT | Performed by: NURSE PRACTITIONER

## 2023-07-15 RX ORDER — ONDANSETRON 2 MG/ML
4 INJECTION INTRAMUSCULAR; INTRAVENOUS
Status: COMPLETED | OUTPATIENT
Start: 2023-07-15 | End: 2023-07-15

## 2023-07-15 RX ORDER — SODIUM CHLORIDE 9 MG/ML
1000 INJECTION, SOLUTION INTRAVENOUS
Status: COMPLETED | OUTPATIENT
Start: 2023-07-15 | End: 2023-07-15

## 2023-07-15 RX ADMIN — ONDANSETRON 4 MG: 2 INJECTION INTRAMUSCULAR; INTRAVENOUS at 06:07

## 2023-07-15 RX ADMIN — SODIUM CHLORIDE 1000 ML: 9 INJECTION, SOLUTION INTRAVENOUS at 06:07

## 2023-07-30 ENCOUNTER — HOSPITAL ENCOUNTER (EMERGENCY)
Facility: HOSPITAL | Age: 48
Discharge: HOME | End: 2023-07-30
Attending: EMERGENCY MEDICINE
Payer: MEDICARE

## 2023-07-30 VITALS
TEMPERATURE: 99 F | DIASTOLIC BLOOD PRESSURE: 100 MMHG | SYSTOLIC BLOOD PRESSURE: 126 MMHG | BODY MASS INDEX: 24.28 KG/M2 | HEART RATE: 91 BPM | WEIGHT: 155 LBS | OXYGEN SATURATION: 98 % | RESPIRATION RATE: 14 BRPM

## 2023-07-30 DIAGNOSIS — M79.89 ARM SWELLING: ICD-10-CM

## 2023-07-30 DIAGNOSIS — I82.621 ACUTE DEEP VEIN THROMBOSIS (DVT) OF RIGHT UPPER EXTREMITY, UNSPECIFIED VEIN: Primary | ICD-10-CM

## 2023-07-30 LAB
ALBUMIN SERPL-MCNC: 4 G/DL (ref 3.5–5)
ALBUMIN/GLOB SERPL: 1.3 RATIO (ref 1.1–2)
ALP SERPL-CCNC: 68 UNIT/L (ref 40–150)
ALT SERPL-CCNC: 19 UNIT/L (ref 0–55)
APTT PPP: 26.9 SECONDS (ref 23.2–33.7)
AST SERPL-CCNC: 20 UNIT/L (ref 5–34)
BASOPHILS # BLD AUTO: 0.02 X10(3)/MCL
BASOPHILS NFR BLD AUTO: 0.3 %
BILIRUBIN DIRECT+TOT PNL SERPL-MCNC: 0.3 MG/DL
BUN SERPL-MCNC: 15.6 MG/DL (ref 8.9–20.6)
CALCIUM SERPL-MCNC: 9.5 MG/DL (ref 8.4–10.2)
CHLORIDE SERPL-SCNC: 104 MMOL/L (ref 98–107)
CO2 SERPL-SCNC: 25 MMOL/L (ref 22–29)
CREAT SERPL-MCNC: 0.88 MG/DL (ref 0.73–1.18)
EOSINOPHIL # BLD AUTO: 0.07 X10(3)/MCL (ref 0–0.9)
EOSINOPHIL NFR BLD AUTO: 0.9 %
ERYTHROCYTE [DISTWIDTH] IN BLOOD BY AUTOMATED COUNT: 13.2 % (ref 11.5–17)
GFR SERPLBLD CREATININE-BSD FMLA CKD-EPI: >60 MLS/MIN/1.73/M2
GLOBULIN SER-MCNC: 3.1 GM/DL (ref 2.4–3.5)
GLUCOSE SERPL-MCNC: 96 MG/DL (ref 74–100)
HCT VFR BLD AUTO: 39.8 % (ref 42–52)
HGB BLD-MCNC: 13.4 G/DL (ref 14–18)
IMM GRANULOCYTES # BLD AUTO: 0.03 X10(3)/MCL (ref 0–0.04)
IMM GRANULOCYTES NFR BLD AUTO: 0.4 %
INR PPP: 1
LYMPHOCYTES # BLD AUTO: 1.53 X10(3)/MCL (ref 0.6–4.6)
LYMPHOCYTES NFR BLD AUTO: 19.9 %
MCH RBC QN AUTO: 29.6 PG (ref 27–31)
MCHC RBC AUTO-ENTMCNC: 33.7 G/DL (ref 33–36)
MCV RBC AUTO: 87.9 FL (ref 80–94)
MONOCYTES # BLD AUTO: 0.92 X10(3)/MCL (ref 0.1–1.3)
MONOCYTES NFR BLD AUTO: 12 %
NEUTROPHILS # BLD AUTO: 5.1 X10(3)/MCL (ref 2.1–9.2)
NEUTROPHILS NFR BLD AUTO: 66.5 %
NRBC BLD AUTO-RTO: 0 %
PLATELET # BLD AUTO: 274 X10(3)/MCL (ref 130–400)
PMV BLD AUTO: 9.8 FL (ref 7.4–10.4)
POTASSIUM SERPL-SCNC: 3.6 MMOL/L (ref 3.5–5.1)
PROT SERPL-MCNC: 7.1 GM/DL (ref 6.4–8.3)
PROTHROMBIN TIME: 13 SECONDS (ref 12.5–14.5)
RBC # BLD AUTO: 4.53 X10(6)/MCL (ref 4.7–6.1)
SODIUM SERPL-SCNC: 139 MMOL/L (ref 136–145)
WBC # SPEC AUTO: 7.67 X10(3)/MCL (ref 4.5–11.5)

## 2023-07-30 PROCEDURE — 63600175 PHARM REV CODE 636 W HCPCS: Performed by: EMERGENCY MEDICINE

## 2023-07-30 PROCEDURE — 85610 PROTHROMBIN TIME: CPT | Performed by: EMERGENCY MEDICINE

## 2023-07-30 PROCEDURE — 96372 THER/PROPH/DIAG INJ SC/IM: CPT | Performed by: EMERGENCY MEDICINE

## 2023-07-30 PROCEDURE — 85025 COMPLETE CBC W/AUTO DIFF WBC: CPT | Performed by: EMERGENCY MEDICINE

## 2023-07-30 PROCEDURE — 85730 THROMBOPLASTIN TIME PARTIAL: CPT | Performed by: EMERGENCY MEDICINE

## 2023-07-30 PROCEDURE — 80053 COMPREHEN METABOLIC PANEL: CPT | Performed by: EMERGENCY MEDICINE

## 2023-07-30 PROCEDURE — 99284 EMERGENCY DEPT VISIT MOD MDM: CPT | Mod: 25

## 2023-07-30 RX ORDER — ENOXAPARIN SODIUM 100 MG/ML
1 INJECTION SUBCUTANEOUS
Status: COMPLETED | OUTPATIENT
Start: 2023-07-30 | End: 2023-07-30

## 2023-07-30 RX ORDER — RIVAROXABAN 15 MG-20MG
KIT ORAL
Qty: 1 EACH | Refills: 0 | Status: SHIPPED | OUTPATIENT
Start: 2023-07-30

## 2023-07-30 RX ORDER — HYDROCODONE BITARTRATE AND ACETAMINOPHEN 5; 325 MG/1; MG/1
1 TABLET ORAL EVERY 6 HOURS PRN
Qty: 12 TABLET | Refills: 0 | Status: SHIPPED | OUTPATIENT
Start: 2023-07-30

## 2023-07-30 RX ADMIN — ENOXAPARIN SODIUM 70 MG: 80 INJECTION SUBCUTANEOUS at 07:07

## 2023-07-30 NOTE — DISCHARGE INSTRUCTIONS
Return to the emergency room immediately you develop chest pain or shortness of breath    You have been prescribed a 3 week supply of the blood thinner within that time you need to follow-up with your primary care for prescription of a longer prescription after that three-week 1st dose

## 2023-07-30 NOTE — FIRST PROVIDER EVALUATION
Medical screening examination initiated.  I have conducted a focused provider triage encounter, findings are as follows:    Chief Complaint   Patient presents with    Arm Swelling     Right arm swelling and pain started on Wednesday, rt radial +2 pulse, swelling noted, previous dvt in right arm       Brief history of present illness:  48 y.o. male presents to the ED with right upper arm pain and swelling onset Wednesday. States he has had blood clot in that arm in the past and became concerned. No injury or trauma     Vitals:    07/30/23 1503   BP: 135/89   Pulse: 110   Resp: 20   Temp: 98.8 °F (37.1 °C)   SpO2: 95%       Pertinent physical exam:  Awake, alert, ambulatory, non-labored respirations    Brief workup plan:  CV US    Preliminary workup initiated; this workup will be continued and followed by the physician or advanced practice provider that is assigned to the patient when roomed.

## 2023-07-30 NOTE — ED PROVIDER NOTES
Encounter Date: 7/30/2023    SCRIBE #1 NOTE: I, Joselyn Sprague, am scribing for, and in the presence of,  Sheldon Mendiola III, MD. I have scribed the following portions of the note - Other sections scribed: HPI, ROS, PE, MDM.       History     Chief Complaint   Patient presents with    Arm Swelling     Right arm swelling and pain started on Wednesday, rt radial +2 pulse, swelling noted, previous dvt in right arm       48 y.o. male with pmhx of Crohn disease and HTN presents to the ED c/o right arm swelling onset 07/27. Pt seen 2 weeks ago in ED for Crohn flare up and received IV in right arm. Pt denies any recent bleeding. Pt denies chest pain, SOB, numbness, and tingling.    The history is provided by the patient and medical records. No  was used.   Illness   The current episode started several days ago (3 days ago). The problem has been unchanged. Pertinent negatives include no fever, no abdominal pain, no sore throat, no shortness of breath and no rash. Recently, medical care has been given at this facility (Seen in ED 2 weeks ago).     Review of patient's allergies indicates:   Allergen Reactions    Ketorolac Swelling    Nalbuphine     Dicyclomine Rash     Other reaction(s): decrease in LOC    Tramadol-acetaminophen Rash     Past Medical History:   Diagnosis Date    Crohn disease     Hypertension      Past Surgical History:   Procedure Laterality Date    BOWEL RESECTION      CHOLECYSTECTOMY       No family history on file.  Social History     Tobacco Use    Smoking status: Never    Smokeless tobacco: Never   Substance Use Topics    Alcohol use: Not Currently    Drug use: Never     Review of Systems   Constitutional:  Negative for fever.   HENT:  Negative for sore throat.    Eyes:  Negative for visual disturbance.   Respiratory:  Negative for shortness of breath.    Cardiovascular:  Negative for chest pain.   Gastrointestinal:  Negative for abdominal pain.   Genitourinary:  Negative for dysuria.    Musculoskeletal:  Negative for joint swelling.   Skin:  Negative for rash.        Swelling to R arm   Neurological:  Negative for weakness and numbness.   Psychiatric/Behavioral:  Negative for confusion.    All other systems reviewed and are negative.      Physical Exam     Initial Vitals [07/30/23 1503]   BP Pulse Resp Temp SpO2   135/89 110 20 98.8 °F (37.1 °C) 95 %      MAP       --         Physical Exam    Nursing note and vitals reviewed.  Constitutional: He appears well-developed and well-nourished.   HENT:   Head: Normocephalic and atraumatic.   Eyes: EOM are normal. Pupils are equal, round, and reactive to light.   Neck:   Normal range of motion.  Cardiovascular:  Regular rhythm, normal heart sounds and intact distal pulses.   Tachycardia present.         Pulmonary/Chest: Breath sounds normal.   Abdominal: Abdomen is soft. Bowel sounds are normal.   Musculoskeletal:         General: Normal range of motion.      Cervical back: Normal range of motion.     Neurological: He is alert and oriented to person, place, and time. He has normal strength. GCS score is 15. GCS eye subscore is 4. GCS verbal subscore is 5. GCS motor subscore is 6.   Skin: Skin is warm and dry. Capillary refill takes less than 2 seconds.   Compared to the LUE, there is a 1.5 cm right arm discrepancy that is more pronounced at the wrist and seems to terminate at the elbow.    Psychiatric: He has a normal mood and affect. Judgment normal.         ED Course   Procedures  Labs Reviewed   CBC WITH DIFFERENTIAL - Abnormal; Notable for the following components:       Result Value    RBC 4.53 (*)     Hgb 13.4 (*)     Hct 39.8 (*)     All other components within normal limits   PROTIME-INR - Normal   APTT - Normal   CBC W/ AUTO DIFFERENTIAL    Narrative:     The following orders were created for panel order CBC auto differential.  Procedure                               Abnormality         Status                     ---------                                -----------         ------                     CBC with Differential[[id number]]        Abnormal            Final result                 Please view results for these tests on the individual orders.   COMPREHENSIVE METABOLIC PANEL          Imaging Results    None          Medications   enoxaparin injection 1 mg/kg (Dosing Weight) (has no administration in time range)              Scribe Attestation:   Scribe #1: I performed the above scribed service and the documentation accurately describes the services I performed. I attest to the accuracy of the note.    Attending Attestation:           Physician Attestation for Scribe:  Physician Attestation Statement for Scribe #1: I, Sheldon Mendiola III, MD, reviewed documentation, as scribed by Joselyn Sprague in my presence, and it is both accurate and complete.         Medical Decision Making      Differential diagnosis includes, but is not limited to, DVT to right arm, PVD, and cellulitis.    No significant erythema or fever so cellulitis a not consistent diagnosis patient neurovascularly intact patient does have a DVT with a history of a previous DVT patient does have a history of Crohn's disease his CBC chemistry PT and PTT are normal full range of motion of hand and arm no signs of compartment syndrome or difficulty.  Patient no chest pain no shortness of breath no history of pulmonary embolus will start patient on oral anticoagulant therapy discussed case with hospitalist given reasonably complex nature patient of patient with a known Crohn's disease diagnosis although significant bleeding recently.  Will discharge will refer to primary care for continuation of Xarelto treatment    Problems Addressed:  Acute deep vein thrombosis (DVT) of right upper extremity, unspecified vein: complicated acute illness or injury  Arm swelling: acute illness or injury    Amount and/or Complexity of Data Reviewed  Labs: ordered. Decision-making details documented in ED  Course.    Risk  Prescription drug management.  Decision regarding hospitalization.           ED Course as of 07/30/23 1907   Sun Jul 30, 2023   1807 Paged hospitalist    [ED]   1819 Spoke with hospitalist  [ED]      ED Course User Index  [ED] Helena Culp                 Clinical Impression:   Final diagnoses:  [M79.89] Arm swelling  [I82.621] Acute deep vein thrombosis (DVT) of right upper extremity, unspecified vein (Primary)        ED Disposition Condition    Discharge Stable          ED Prescriptions       Medication Sig Dispense Start Date End Date Auth. Provider    XARELTO DVT-PE TREAT 30D START 15 mg (42)- 20 mg (9) tablet dose pack Take 1 tablet (15 mg) by mouth twice daily with food for 21 days followed by 1 tablet (20 mg) by mouth once daily with food 1 each 7/30/2023 -- Sheldon Mendiola III, MD    HYDROcodone-acetaminophen (NORCO) 5-325 mg per tablet Take 1 tablet by mouth every 6 (six) hours as needed for Pain. 12 tablet 7/30/2023 -- Sheldon Mendiola III, MD          Follow-up Information       Follow up With Specialties Details Why Contact Info    [facility]  In 3 days  Please call [phone] to get established with a primary care             Sheldon Mendiola III, MD  07/30/23 1909

## 2023-07-30 NOTE — Clinical Note
"Bryon Paulson (Kevin) was seen and treated in our emergency department on 7/30/2023.  He may return to work on 08/02/2023.       If you have any questions or concerns, please don't hesitate to call.      Sheldon Mendiola III, MD"

## 2023-09-13 DIAGNOSIS — I82.409 DVT (DEEP VENOUS THROMBOSIS): Primary | ICD-10-CM

## 2024-02-10 ENCOUNTER — HOSPITAL ENCOUNTER (EMERGENCY)
Facility: HOSPITAL | Age: 49
Discharge: HOME | End: 2024-02-10
Attending: EMERGENCY MEDICINE
Payer: MEDICARE

## 2024-02-10 VITALS
OXYGEN SATURATION: 100 % | BODY MASS INDEX: 22.76 KG/M2 | HEART RATE: 96 BPM | RESPIRATION RATE: 20 BRPM | TEMPERATURE: 98 F | SYSTOLIC BLOOD PRESSURE: 127 MMHG | HEIGHT: 67 IN | DIASTOLIC BLOOD PRESSURE: 99 MMHG | WEIGHT: 145 LBS

## 2024-02-10 DIAGNOSIS — S86.891A RIGHT MEDIAL TIBIAL STRESS SYNDROME, INITIAL ENCOUNTER: Primary | ICD-10-CM

## 2024-02-10 PROCEDURE — 99283 EMERGENCY DEPT VISIT LOW MDM: CPT | Mod: 25

## 2024-02-10 RX ORDER — NAPROXEN 500 MG/1
500 TABLET ORAL 2 TIMES DAILY WITH MEALS
Qty: 14 TABLET | Refills: 0 | Status: SHIPPED | OUTPATIENT
Start: 2024-02-10 | End: 2024-02-17

## 2024-02-10 NOTE — ED PROVIDER NOTES
Encounter Date: 2/10/2024       History     Chief Complaint   Patient presents with    Leg Pain     C/o atraumatic right leg pain x 2.5 weeks, denies swelling or discoloration. Reports hx of DVT approx. 1 year ago, no longer on thinners. CMS intact, DP pulse 2+.     48 y.o. male presents to the ED with right anterior leg pain that started about 2 weeks. No swelling, travel, numbness, tingling. Hx of DVT to right arm, not currently on anticoagulation. Denies injury or trauma. Has not tried any OTC medications     The history is provided by the patient. No  was used.     Review of patient's allergies indicates:   Allergen Reactions    Ketorolac Swelling    Nalbuphine     Dicyclomine Rash     Other reaction(s): decrease in LOC    Tramadol-acetaminophen Rash     Past Medical History:   Diagnosis Date    Crohn disease     DVT (deep venous thrombosis)     Hypertension      Past Surgical History:   Procedure Laterality Date    BOWEL RESECTION      CHOLECYSTECTOMY       History reviewed. No pertinent family history.  Social History     Tobacco Use    Smoking status: Never    Smokeless tobacco: Never   Substance Use Topics    Alcohol use: Not Currently    Drug use: Never     Review of Systems   Constitutional:  Negative for fever.   HENT:  Negative for sore throat.    Respiratory:  Negative for shortness of breath.    Cardiovascular:  Negative for chest pain.   Gastrointestinal:  Negative for nausea.   Genitourinary:  Negative for dysuria.   Musculoskeletal:  Positive for myalgias. Negative for back pain.   Skin:  Negative for rash.   Neurological:  Negative for weakness.   Hematological:  Does not bruise/bleed easily.   All other systems reviewed and are negative.      Physical Exam     Initial Vitals [02/10/24 1217]   BP Pulse Resp Temp SpO2   116/76 95 20 98 °F (36.7 °C) 100 %      MAP       --         Physical Exam    Nursing note and vitals reviewed.  Constitutional: He appears well-developed and  well-nourished.   HENT:   Head: Normocephalic and atraumatic.   Eyes: EOM are normal. Pupils are equal, round, and reactive to light.   Neck: Neck supple.   Normal range of motion.  Cardiovascular:  Normal rate, regular rhythm, normal heart sounds and intact distal pulses.           Pulmonary/Chest: Breath sounds normal.   Musculoskeletal:         General: Normal range of motion.      Cervical back: Normal range of motion and neck supple.      Right lower leg: Tenderness (anterior tib) present. No swelling, deformity, lacerations or bony tenderness. No edema.     Neurological: He is alert and oriented to person, place, and time. He has normal strength. GCS score is 15. GCS eye subscore is 4. GCS verbal subscore is 5. GCS motor subscore is 6.   Skin: Skin is warm and dry. Capillary refill takes less than 2 seconds.   Psychiatric: He has a normal mood and affect.         ED Course   Procedures  Labs Reviewed - No data to display       Imaging Results    None          Medications - No data to display  Medical Decision Making  Differential diagnosis: shin splint, DVT, muscle strain     48 y.o. male presents to the ED with right anterior leg pain that started about 2 weeks. No swelling, travel, numbness, tingling. Hx of DVT to right arm, not currently on anticoagulation. Denies injury or trauma. Has not tried any OTC medications       Amount and/or Complexity of Data Reviewed  Radiology: ordered.    Risk  Prescription drug management.               ED Course as of 02/10/24 1250   Sat Feb 10, 2024   1235 There was no evidence of deep or superficial vein thrombosis in the right lower extremity per CV US [MA]      ED Course User Index  [MA] Ever Zhao, COLBY                           Clinical Impression:  Final diagnoses:  [S86.891A] Right medial tibial stress syndrome, initial encounter (Primary)          ED Disposition Condition    Discharge Stable          ED Prescriptions       Medication Sig Dispense Start Date  End Date Auth. Provider    naproxen (NAPROSYN) 500 MG tablet Take 1 tablet (500 mg total) by mouth 2 (two) times daily with meals. for 7 days 14 tablet 2/10/2024 2/17/2024 Ever Zhao PA-C          Follow-up Information       Follow up With Specialties Details Why Contact Info    [facility] - Emergency Dept Emergency Medicine In 1 week If symptoms worsen [address], [place] [id number] [phone]    Primary care provider  In 2 days As needed              Ever Zhao PA-C  02/10/24 1253

## 2024-02-10 NOTE — FIRST PROVIDER EVALUATION
"Medical screening examination initiated.  I have conducted a focused provider triage encounter, findings are as follows:    Chief Complaint   Patient presents with    Leg Pain     C/o atraumatic right leg pain x 2.5 weeks, denies swelling or discoloration. Reports hx of DVT approx. 1 year ago, no longer on thinners. CMS intact, DP pulse 2+.     Brief history of present illness:  48 y.o. male presents to the ED with right leg pain that started about 2 weeks. No swelling, travel, numbness, tingling. Hx of DVT to right arm, not currently on anticoagulation.     Vitals:    02/10/24 1217   BP: 116/76   Pulse: 95   Resp: 20   Temp: 98 °F (36.7 °C)   TempSrc: Oral   SpO2: 100%   Weight: 65.8 kg (145 lb)   Height: 5' 7" (1.702 m)       Pertinent physical exam:  Awake, alert, ambulatory, non-labored respirations    Brief workup plan:  NIVA    Preliminary workup initiated; this workup will be continued and followed by the physician or advanced practice provider that is assigned to the patient when roomed.  "

## 2024-04-25 ENCOUNTER — HOSPITAL ENCOUNTER (EMERGENCY)
Facility: HOSPITAL | Age: 49
Discharge: HOME | End: 2024-04-25
Attending: EMERGENCY MEDICINE
Payer: MEDICARE

## 2024-04-25 VITALS
TEMPERATURE: 99 F | HEART RATE: 90 BPM | HEIGHT: 67 IN | SYSTOLIC BLOOD PRESSURE: 153 MMHG | RESPIRATION RATE: 13 BRPM | BODY MASS INDEX: 22.76 KG/M2 | OXYGEN SATURATION: 94 % | WEIGHT: 145 LBS | DIASTOLIC BLOOD PRESSURE: 107 MMHG

## 2024-04-25 DIAGNOSIS — K50.90 EXACERBATION OF CROHN'S DISEASE WITHOUT COMPLICATION: Primary | ICD-10-CM

## 2024-04-25 LAB
ALBUMIN SERPL-MCNC: 4.1 G/DL (ref 3.5–5)
ALBUMIN/GLOB SERPL: 1.1 RATIO (ref 1.1–2)
ALP SERPL-CCNC: 72 UNIT/L (ref 40–150)
ALT SERPL-CCNC: 14 UNIT/L (ref 0–55)
APPEARANCE UR: CLEAR
AST SERPL-CCNC: 20 UNIT/L (ref 5–34)
BACTERIA #/AREA URNS AUTO: ABNORMAL /HPF
BASOPHILS # BLD AUTO: 0.02 X10(3)/MCL
BASOPHILS NFR BLD AUTO: 0.4 %
BILIRUB SERPL-MCNC: 0.4 MG/DL
BILIRUB UR QL STRIP.AUTO: NEGATIVE
BUN SERPL-MCNC: 8.5 MG/DL (ref 8.9–20.6)
CALCIUM SERPL-MCNC: 9 MG/DL (ref 8.4–10.2)
CHLORIDE SERPL-SCNC: 109 MMOL/L (ref 98–107)
CO2 SERPL-SCNC: 19 MMOL/L (ref 22–29)
COLOR UR AUTO: ABNORMAL
CREAT SERPL-MCNC: 0.91 MG/DL (ref 0.73–1.18)
EOSINOPHIL # BLD AUTO: 0.03 X10(3)/MCL (ref 0–0.9)
EOSINOPHIL NFR BLD AUTO: 0.6 %
ERYTHROCYTE [DISTWIDTH] IN BLOOD BY AUTOMATED COUNT: 13.1 % (ref 11.5–17)
GFR SERPLBLD CREATININE-BSD FMLA CKD-EPI: >60 MLS/MIN/1.73/M2
GLOBULIN SER-MCNC: 3.7 GM/DL (ref 2.4–3.5)
GLUCOSE SERPL-MCNC: 74 MG/DL (ref 74–100)
GLUCOSE UR QL STRIP.AUTO: NORMAL
HCT VFR BLD AUTO: 44 % (ref 42–52)
HGB BLD-MCNC: 14.6 G/DL (ref 14–18)
HYALINE CASTS #/AREA URNS LPF: ABNORMAL /LPF
IMM GRANULOCYTES # BLD AUTO: 0.01 X10(3)/MCL (ref 0–0.04)
IMM GRANULOCYTES NFR BLD AUTO: 0.2 %
KETONES UR QL STRIP.AUTO: NEGATIVE
LEUKOCYTE ESTERASE UR QL STRIP.AUTO: NEGATIVE
LIPASE SERPL-CCNC: 9 U/L
LYMPHOCYTES # BLD AUTO: 1.21 X10(3)/MCL (ref 0.6–4.6)
LYMPHOCYTES NFR BLD AUTO: 24.5 %
MCH RBC QN AUTO: 30 PG (ref 27–31)
MCHC RBC AUTO-ENTMCNC: 33.2 G/DL (ref 33–36)
MCV RBC AUTO: 90.5 FL (ref 80–94)
MONOCYTES # BLD AUTO: 0.48 X10(3)/MCL (ref 0.1–1.3)
MONOCYTES NFR BLD AUTO: 9.7 %
MUCOUS THREADS URNS QL MICRO: ABNORMAL /LPF
NEUTROPHILS # BLD AUTO: 3.18 X10(3)/MCL (ref 2.1–9.2)
NEUTROPHILS NFR BLD AUTO: 64.6 %
NITRITE UR QL STRIP.AUTO: NEGATIVE
NRBC BLD AUTO-RTO: 0 %
PH UR STRIP.AUTO: 5.5 [PH]
PLATELET # BLD AUTO: 320 X10(3)/MCL (ref 130–400)
PMV BLD AUTO: 9.5 FL (ref 7.4–10.4)
POTASSIUM SERPL-SCNC: 3.8 MMOL/L (ref 3.5–5.1)
PROT SERPL-MCNC: 7.8 GM/DL (ref 6.4–8.3)
PROT UR QL STRIP.AUTO: NEGATIVE
RBC # BLD AUTO: 4.86 X10(6)/MCL (ref 4.7–6.1)
RBC #/AREA URNS AUTO: ABNORMAL /HPF
RBC UR QL AUTO: NEGATIVE
SODIUM SERPL-SCNC: 138 MMOL/L (ref 136–145)
SP GR UR STRIP.AUTO: 1.02 (ref 1–1.03)
SQUAMOUS #/AREA URNS LPF: ABNORMAL /HPF
UROBILINOGEN UR STRIP-ACNC: NORMAL
WBC # SPEC AUTO: 4.93 X10(3)/MCL (ref 4.5–11.5)
WBC #/AREA URNS AUTO: ABNORMAL /HPF

## 2024-04-25 PROCEDURE — 85025 COMPLETE CBC W/AUTO DIFF WBC: CPT | Performed by: PHYSICIAN ASSISTANT

## 2024-04-25 PROCEDURE — 96374 THER/PROPH/DIAG INJ IV PUSH: CPT | Mod: 59

## 2024-04-25 PROCEDURE — 63600175 PHARM REV CODE 636 W HCPCS

## 2024-04-25 PROCEDURE — 96375 TX/PRO/DX INJ NEW DRUG ADDON: CPT

## 2024-04-25 PROCEDURE — 99285 EMERGENCY DEPT VISIT HI MDM: CPT | Mod: 25

## 2024-04-25 PROCEDURE — 81001 URINALYSIS AUTO W/SCOPE: CPT | Performed by: PHYSICIAN ASSISTANT

## 2024-04-25 PROCEDURE — 25500020 PHARM REV CODE 255

## 2024-04-25 PROCEDURE — 96361 HYDRATE IV INFUSION ADD-ON: CPT

## 2024-04-25 PROCEDURE — 80053 COMPREHEN METABOLIC PANEL: CPT | Performed by: PHYSICIAN ASSISTANT

## 2024-04-25 PROCEDURE — 63600175 PHARM REV CODE 636 W HCPCS: Performed by: PHYSICIAN ASSISTANT

## 2024-04-25 PROCEDURE — 83690 ASSAY OF LIPASE: CPT | Performed by: PHYSICIAN ASSISTANT

## 2024-04-25 RX ORDER — HYDROCODONE BITARTRATE AND ACETAMINOPHEN 7.5; 325 MG/1; MG/1
1 TABLET ORAL EVERY 6 HOURS PRN
Qty: 12 TABLET | Refills: 0 | Status: SHIPPED | OUTPATIENT
Start: 2024-04-25 | End: 2024-04-28

## 2024-04-25 RX ORDER — ONDANSETRON HYDROCHLORIDE 2 MG/ML
4 INJECTION, SOLUTION INTRAVENOUS
Status: COMPLETED | OUTPATIENT
Start: 2024-04-25 | End: 2024-04-25

## 2024-04-25 RX ORDER — METHYLPREDNISOLONE SOD SUCC 125 MG
125 VIAL (EA) INJECTION
Status: COMPLETED | OUTPATIENT
Start: 2024-04-25 | End: 2024-04-25

## 2024-04-25 RX ORDER — HYDROMORPHONE HYDROCHLORIDE 2 MG/ML
1 INJECTION, SOLUTION INTRAMUSCULAR; INTRAVENOUS; SUBCUTANEOUS
Status: COMPLETED | OUTPATIENT
Start: 2024-04-25 | End: 2024-04-25

## 2024-04-25 RX ORDER — ONDANSETRON 4 MG/1
4 TABLET, ORALLY DISINTEGRATING ORAL EVERY 6 HOURS PRN
Qty: 20 TABLET | Refills: 0 | Status: SHIPPED | OUTPATIENT
Start: 2024-04-25

## 2024-04-25 RX ORDER — PREDNISONE 10 MG/1
TABLET ORAL
Qty: 70 TABLET | Refills: 0 | Status: SHIPPED | OUTPATIENT
Start: 2024-04-25 | End: 2024-05-23

## 2024-04-25 RX ADMIN — ONDANSETRON 4 MG: 2 INJECTION INTRAMUSCULAR; INTRAVENOUS at 11:04

## 2024-04-25 RX ADMIN — IOHEXOL 100 ML: 350 INJECTION, SOLUTION INTRAVENOUS at 04:04

## 2024-04-25 RX ADMIN — SODIUM CHLORIDE, POTASSIUM CHLORIDE, SODIUM LACTATE AND CALCIUM CHLORIDE 1000 ML: 600; 310; 30; 20 INJECTION, SOLUTION INTRAVENOUS at 11:04

## 2024-04-25 RX ADMIN — HYDROMORPHONE HYDROCHLORIDE 1 MG: 2 INJECTION INTRAMUSCULAR; INTRAVENOUS; SUBCUTANEOUS at 02:04

## 2024-04-25 RX ADMIN — METHYLPREDNISOLONE SODIUM SUCCINATE 125 MG: 125 INJECTION, POWDER, FOR SOLUTION INTRAMUSCULAR; INTRAVENOUS at 05:04

## 2024-04-25 NOTE — ED PROVIDER NOTES
Encounter Date: 4/25/2024       History     Chief Complaint   Patient presents with    Abdominal Pain     Mid abdominal pain, N/V/D x1.5 wks. Hx Crohn's, not on meds (in remission). GI Dr Holley.      The patient is a 48 y.o. male with a history of Crohn's disease and hypertension who presents to the Emergency Department with a chief complaint of generalized abdominal pain. Symptoms began 1.5 weeks ago and have been constant since onset. His pain is currently rated as a 6/10 in severity and described as aching with no radiation. Associated symptoms include nausea, vomiting, and diarrhea. Symptoms are aggravated with nothing and there are no alleviating factors. The patient denies fever or chills. He reports taking nothing prior to arrival with no relief of symptoms. No other reported symptoms at this time     The history is provided by the patient. No  was used.   Abdominal Pain  The current episode started several days ago. The onset of the illness was gradual. The problem has not changed since onset.The abdominal pain is generalized. The abdominal pain does not radiate. The severity of the abdominal pain is 6/10. The abdominal pain is relieved by nothing. The other symptoms of the illness include nausea, vomiting and diarrhea. The other symptoms of the illness do not include fever, shortness of breath or dysuria.   The diarrhea occurs 2 - 4 times per day.   Nausea began 3 to 5 days ago.   The vomiting began more than 2 days ago. Vomiting occurs 2 to 5 times per day.   Symptoms associated with the illness do not include chills, urgency, frequency or back pain.     Review of patient's allergies indicates:   Allergen Reactions    Ketorolac Swelling    Nalbuphine     Dicyclomine Rash     Other reaction(s): decrease in LOC    Tramadol-acetaminophen Rash     Past Medical History:   Diagnosis Date    Crohn disease     DVT (deep venous thrombosis)     Hypertension      Past Surgical History:    Procedure Laterality Date    BOWEL RESECTION      CHOLECYSTECTOMY       No family history on file.  Social History     Tobacco Use    Smoking status: Never    Smokeless tobacco: Never   Substance Use Topics    Alcohol use: Not Currently    Drug use: Never     Review of Systems   Constitutional:  Negative for chills and fever.   HENT:  Negative for sore throat.    Respiratory:  Negative for shortness of breath.    Cardiovascular:  Negative for chest pain.   Gastrointestinal:  Positive for abdominal pain, diarrhea, nausea and vomiting.   Genitourinary:  Negative for dysuria, frequency and urgency.   Musculoskeletal:  Negative for back pain.   Skin:  Negative for rash.   Neurological:  Negative for weakness.   Hematological:  Does not bruise/bleed easily.   All other systems reviewed and are negative.      Physical Exam     Initial Vitals [04/25/24 1130]   BP Pulse Resp Temp SpO2   114/79 100 18 98.8 °F (37.1 °C) 100 %      MAP       --         Physical Exam    Nursing note and vitals reviewed.  Constitutional: Vital signs are normal. He appears well-developed and well-nourished.   HENT:   Head: Normocephalic.   Right Ear: Hearing and tympanic membrane normal.   Left Ear: Hearing and tympanic membrane normal.   Mouth/Throat: Uvula is midline, oropharynx is clear and moist and mucous membranes are normal.   Eyes: Conjunctivae and EOM are normal. Pupils are equal, round, and reactive to light.   Cardiovascular:  Normal rate, regular rhythm, normal heart sounds and normal pulses.           Pulmonary/Chest: Effort normal and breath sounds normal.   Abdominal: Abdomen is soft. Bowel sounds are normal. There is abdominal tenderness.   Musculoskeletal:      Cervical back: No spinous process tenderness or muscular tenderness.     Lymphadenopathy:     He has no cervical adenopathy.   Neurological: He is alert. GCS eye subscore is 4. GCS verbal subscore is 5. GCS motor subscore is 6.   Skin: Skin is warm, dry and intact.  Capillary refill takes less than 2 seconds.         ED Course   Procedures  Labs Reviewed   COMPREHENSIVE METABOLIC PANEL - Abnormal; Notable for the following components:       Result Value    Chloride 109 (*)     Carbon Dioxide 19 (*)     Blood Urea Nitrogen 8.5 (*)     Globulin 3.7 (*)     All other components within normal limits   URINALYSIS, REFLEX TO URINE CULTURE - Abnormal; Notable for the following components:    Mucous, UA Moderate (*)     Hyaline Casts, UA 0-2 (*)     All other components within normal limits   LIPASE - Normal   CBC W/ AUTO DIFFERENTIAL    Narrative:     The following orders were created for panel order CBC auto differential.  Procedure                               Abnormality         Status                     ---------                               -----------         ------                     CBC with Differential[[id number]]                            Final result                 Please view results for these tests on the individual orders.   CBC WITH DIFFERENTIAL          Imaging Results              CT Abdomen Pelvis With IV Contrast NO Oral Contrast (Final result)  Result time 04/25/24 16:49:47      Final result by Thiago Granda MD (04/25/24 16:49:47)                   Impression:      Active bowel inflammation involving the descending colon.  No perforation or abscess.      Electronically signed by: Thiago Granda  Date:    04/25/2024  Time:    16:49               Narrative:    EXAMINATION:  CT ABDOMEN PELVIS WITH IV CONTRAST    CLINICAL HISTORY:  Abdominal pain, acute, nonlocalized;Nausea/vomiting;    TECHNIQUE:  Helical acquisition through the abdomen and pelvis with IV contrast.  Three plane reconstructions were provided for review.  mGycm. Automatic exposure control, adjustment of mA/kV or iterative reconstruction technique was used to reduce radiation.    COMPARISON:  10 July 2022    FINDINGS:  The limited imaged lung bases are clear.    A small enhancing lesion in  the right hepatic lobe is stable going back to 2022.  The portal vein is patent.  The gallbladder is surgically absent.  Similar mild biliary ductal dilatation.    No significant abnormality of the spleen, pancreas or adrenals.  There is no hydronephrosis or suspicious renal lesion.  There are small bilateral renal calculi.    There is no bowel obstruction.  There is a suture line right colon.  The descending colon demonstrates some wall thickening and mucosal enhancement.  Large amount of stool in the rectum.    Urinary bladder unremarkable.  No pelvic free fluid.  Abdominal aorta normal in caliber.  Minimal atherosclerotic disease.    No significant abnormality of the osseous structures.                                       Medications   lactated ringers bolus 1,000 mL (0 mLs Intravenous Stopped 4/25/24 1250)   ondansetron injection 4 mg (4 mg Intravenous Given 4/25/24 1150)   HYDROmorphone (PF) injection 1 mg (1 mg Intravenous Given 4/25/24 1425)   iohexoL (OMNIPAQUE 350) injection 100 mL (100 mLs Intravenous Given 4/25/24 1626)   methylPREDNISolone sodium succinate injection 125 mg (125 mg Intravenous Given 4/25/24 1708)     Medical Decision Making  The patient is a 48 y.o. male with a history of Crohn's disease and hypertension who presents to the Emergency Department with a chief complaint of generalized abdominal pain. Symptoms began 1.5 weeks ago and have been constant since onset. His pain is currently rated as a 6/10 in severity and described as aching with no radiation. Associated symptoms include nausea, vomiting, and diarrhea. Symptoms are aggravated with nothing and there are no alleviating factors. The patient denies fever or chills. He reports taking nothing prior to arrival with no relief of symptoms. No other reported symptoms at this time     Judging by the patient's chief complaint and pertinent history, the patient has the following possible differential diagnoses, including but not limited to  the following.  Some of these are deemed to be lower likelihood and some more likely based on my physical exam and history combined with possible lab work and/or imaging studies.   Please see the pertinent studies, and refer to the HPI.  Some of these diagnoses will take further evaluation to fully rule out, perhaps as an outpatient and the patient was encouraged to follow up when discharged for more comprehensive evaluation.    appendicitis, biliary disease, diverticulitis, intraabdominal abcess, retroperitoneal abcess, gastritis, gastroenteritis, hepatitis, hernia, pancreatitis, inflammatory bowel disease, PUD, SBP, nephrolithiasis, consitpation, GERD, IBS     Problems Addressed:  Exacerbation of Crohn's disease without complication: acute illness or injury    Amount and/or Complexity of Data Reviewed  Radiology: ordered.  Discussion of management or test interpretation with external provider(s): Discussed with Dr. Acuna who recommends steroid taper and follow up    Risk  Prescription drug management.               ED Course as of 04/25/24 1837   Thu Apr 25, 2024   1701 I discussed results in detail with the patient.  Patient reports that his pain has improved and he prefers to go home and trial steroids.  I did discuss with the patient admission versus discharge.  Patient declines admission.  I think this is reasonable as his pain had improved significantly he is tolerating p.o. without difficulty.  States that he will try steroids and return to the ER with any worsening of his symptoms.  Patient given strict ER return precautions. [LM]   1800 Awaiting call back from GI [LM]   1812 I discussed with Dr. Acuna with GI. Recommends 40 mg steroid taper and have patient follow up. Patient has upcoming appointment with GI [LM]      ED Course User Index  [LM] Ruben Harris, NP                           Clinical Impression:  Final diagnoses:  [K50.90] Exacerbation of Crohn's disease without complication (Primary)           ED Disposition Condition    Discharge Stable          ED Prescriptions       Medication Sig Dispense Start Date End Date Auth. Provider    HYDROcodone-acetaminophen (NORCO) 7.5-325 mg per tablet Take 1 tablet by mouth every 6 (six) hours as needed for Pain. 12 tablet 4/25/2024 4/28/2024 Ruben Harris NP    ondansetron (ZOFRAN-ODT) 4 MG TbDL Take 1 tablet (4 mg total) by mouth every 6 (six) hours as needed (Nausea). 20 tablet 4/25/2024 -- Ruben Harris NP    predniSONE (DELTASONE) 10 MG tablet Take 4 tablets (40 mg total) by mouth once daily for 7 days, THEN 3 tablets (30 mg total) once daily for 7 days, THEN 2 tablets (20 mg total) once daily for 7 days, THEN 1 tablet (10 mg total) once daily for 7 days. 70 tablet 4/25/2024 5/23/2024 Ruben Harris NP          Follow-up Information       Follow up With Specialties Details Why Contact Info    JORDAN Holley MD Gastroenterology Schedule an appointment as soon as possible for a visit   [address] [place] [address].  [place] [id number]  [phone]               Ruben Harris NP  04/25/24 2235

## 2024-04-25 NOTE — FIRST PROVIDER EVALUATION
Medical screening examination initiated.  I have conducted a focused provider triage encounter, findings are as follows:    Brief history of present illness:  48-year-old male with history of Crohn's presents to ED for evaluation of generalized abdominal pain with nausea vomiting diarrhea over the last 1.5 weeks.  Patient denies any fever.  Not currently being treated for Crohn's.    There were no vitals filed for this visit.    Pertinent physical exam:  Patient is awake and alert and oriented.  Ambulatory to triage.  In no acute distress.      Brief workup plan:  labs, UA, IVF, zofran    Preliminary workup initiated; this workup will be continued and followed by the physician or advanced practice provider that is assigned to the patient when roomed.

## 2024-05-10 ENCOUNTER — LAB VISIT (OUTPATIENT)
Dept: LAB | Facility: HOSPITAL | Age: 49
End: 2024-05-10
Attending: PHYSICIAN ASSISTANT
Payer: MEDICARE

## 2024-05-10 DIAGNOSIS — R19.7 DIARRHEA OF PRESUMED INFECTIOUS ORIGIN: ICD-10-CM

## 2024-05-10 DIAGNOSIS — K50.90 CROHN DISEASE: Primary | ICD-10-CM

## 2024-05-10 LAB
CRP SERPL-MCNC: 1.1 MG/L
ERYTHROCYTE [SEDIMENTATION RATE] IN BLOOD: 33 MM/HR (ref 0–15)
FOLATE SERPL-MCNC: 11.1 NG/ML (ref 7–31.4)
IRON SATN MFR SERPL: 32 % (ref 20–50)
IRON SERPL-MCNC: 122 UG/DL (ref 65–175)
TIBC SERPL-MCNC: 254 UG/DL (ref 69–240)
TIBC SERPL-MCNC: 376 UG/DL (ref 250–450)
TRANSFERRIN SERPL-MCNC: 344 MG/DL (ref 174–364)
VIT B12 SERPL-MCNC: 433 PG/ML (ref 213–816)

## 2024-05-10 PROCEDURE — 83540 ASSAY OF IRON: CPT

## 2024-05-10 PROCEDURE — 36415 COLL VENOUS BLD VENIPUNCTURE: CPT

## 2024-05-10 PROCEDURE — 86704 HEP B CORE ANTIBODY TOTAL: CPT

## 2024-05-10 PROCEDURE — 86708 HEPATITIS A ANTIBODY: CPT

## 2024-05-10 PROCEDURE — 82746 ASSAY OF FOLIC ACID SERUM: CPT

## 2024-05-10 PROCEDURE — 86140 C-REACTIVE PROTEIN: CPT

## 2024-05-10 PROCEDURE — 87340 HEPATITIS B SURFACE AG IA: CPT

## 2024-05-10 PROCEDURE — 86709 HEPATITIS A IGM ANTIBODY: CPT

## 2024-05-10 PROCEDURE — 86708 HEPATITIS A ANTIBODY: CPT | Mod: 59

## 2024-05-10 PROCEDURE — 86705 HEP B CORE ANTIBODY IGM: CPT

## 2024-05-10 PROCEDURE — 85652 RBC SED RATE AUTOMATED: CPT

## 2024-05-10 PROCEDURE — 86706 HEP B SURFACE ANTIBODY: CPT

## 2024-05-10 PROCEDURE — 82607 VITAMIN B-12: CPT

## 2024-05-11 LAB
HAV AB SER QL IA: REACTIVE
HAV IGM SERPL QL IA: NONREACTIVE
HBV CORE AB SERPL QL IA: REACTIVE
HBV CORE IGM SERPL QL IA: NONREACTIVE
HBV SURFACE AB SER-ACNC: 18.45 MIU/ML
HBV SURFACE AB SERPL IA-ACNC: REACTIVE M[IU]/ML
HBV SURFACE AG SERPL QL IA: NONREACTIVE
MAYO GENERIC ORDERABLE RESULT: NORMAL

## 2024-05-17 DIAGNOSIS — K50.90 CROHN DISEASE: Primary | ICD-10-CM

## 2024-10-25 ENCOUNTER — PATIENT MESSAGE (OUTPATIENT)
Dept: RESEARCH | Facility: HOSPITAL | Age: 49
End: 2024-10-25
Payer: MEDICARE

## 2024-12-15 ENCOUNTER — HOSPITAL ENCOUNTER (EMERGENCY)
Facility: HOSPITAL | Age: 49
Discharge: HOME | End: 2024-12-15
Attending: STUDENT IN AN ORGANIZED HEALTH CARE EDUCATION/TRAINING PROGRAM
Payer: MEDICARE

## 2024-12-15 VITALS
DIASTOLIC BLOOD PRESSURE: 92 MMHG | WEIGHT: 148 LBS | HEIGHT: 67 IN | RESPIRATION RATE: 11 BRPM | SYSTOLIC BLOOD PRESSURE: 127 MMHG | OXYGEN SATURATION: 99 % | HEART RATE: 98 BPM | BODY MASS INDEX: 23.23 KG/M2 | TEMPERATURE: 99 F

## 2024-12-15 DIAGNOSIS — K50.90 CROHN'S DISEASE WITHOUT COMPLICATION, UNSPECIFIED GASTROINTESTINAL TRACT LOCATION: Primary | ICD-10-CM

## 2024-12-15 DIAGNOSIS — R10.84 ACUTE GENERALIZED ABDOMINAL PAIN: ICD-10-CM

## 2024-12-15 LAB
ALBUMIN SERPL-MCNC: 4.1 G/DL (ref 3.5–5)
ALBUMIN/GLOB SERPL: 1.2 RATIO (ref 1.1–2)
ALP SERPL-CCNC: 76 UNIT/L (ref 40–150)
ALT SERPL-CCNC: 20 UNIT/L (ref 0–55)
ANION GAP SERPL CALC-SCNC: 7 MEQ/L
AST SERPL-CCNC: 20 UNIT/L (ref 5–34)
BACTERIA #/AREA URNS AUTO: ABNORMAL /HPF
BASOPHILS # BLD AUTO: 0.04 X10(3)/MCL
BASOPHILS NFR BLD AUTO: 0.7 %
BILIRUB SERPL-MCNC: 0.5 MG/DL
BILIRUB UR QL STRIP.AUTO: NEGATIVE
BUN SERPL-MCNC: 14.5 MG/DL (ref 8.9–20.6)
CALCIUM SERPL-MCNC: 9.4 MG/DL (ref 8.4–10.2)
CHLORIDE SERPL-SCNC: 106 MMOL/L (ref 98–107)
CLARITY UR: CLEAR
CO2 SERPL-SCNC: 26 MMOL/L (ref 22–29)
COLOR UR AUTO: YELLOW
CREAT SERPL-MCNC: 0.87 MG/DL (ref 0.72–1.25)
CREAT/UREA NIT SERPL: 17
EOSINOPHIL # BLD AUTO: 0.03 X10(3)/MCL (ref 0–0.9)
EOSINOPHIL NFR BLD AUTO: 0.5 %
ERYTHROCYTE [DISTWIDTH] IN BLOOD BY AUTOMATED COUNT: 13.4 % (ref 11.5–17)
GFR SERPLBLD CREATININE-BSD FMLA CKD-EPI: >60 ML/MIN/1.73/M2
GLOBULIN SER-MCNC: 3.3 GM/DL (ref 2.4–3.5)
GLUCOSE SERPL-MCNC: 123 MG/DL (ref 74–100)
GLUCOSE UR QL STRIP: NORMAL
HCT VFR BLD AUTO: 41.1 % (ref 42–52)
HGB BLD-MCNC: 13.9 G/DL (ref 14–18)
HGB UR QL STRIP: NEGATIVE
HYALINE CASTS #/AREA URNS LPF: ABNORMAL /LPF
IMM GRANULOCYTES # BLD AUTO: 0.01 X10(3)/MCL (ref 0–0.04)
IMM GRANULOCYTES NFR BLD AUTO: 0.2 %
KETONES UR QL STRIP: NEGATIVE
LACTATE SERPL-SCNC: 1.6 MMOL/L (ref 0.5–2.2)
LEUKOCYTE ESTERASE UR QL STRIP: NEGATIVE
LIPASE SERPL-CCNC: 9 U/L
LYMPHOCYTES # BLD AUTO: 1.7 X10(3)/MCL (ref 0.6–4.6)
LYMPHOCYTES NFR BLD AUTO: 27.8 %
MCH RBC QN AUTO: 29.4 PG (ref 27–31)
MCHC RBC AUTO-ENTMCNC: 33.8 G/DL (ref 33–36)
MCV RBC AUTO: 87.1 FL (ref 80–94)
MONOCYTES # BLD AUTO: 0.54 X10(3)/MCL (ref 0.1–1.3)
MONOCYTES NFR BLD AUTO: 8.8 %
MUCOUS THREADS URNS QL MICRO: ABNORMAL /LPF
NEUTROPHILS # BLD AUTO: 3.79 X10(3)/MCL (ref 2.1–9.2)
NEUTROPHILS NFR BLD AUTO: 62 %
NITRITE UR QL STRIP: NEGATIVE
NRBC BLD AUTO-RTO: 0 %
PH UR STRIP: 5.5 [PH]
PLATELET # BLD AUTO: 327 X10(3)/MCL (ref 130–400)
PMV BLD AUTO: 9.4 FL (ref 7.4–10.4)
POTASSIUM SERPL-SCNC: 3.7 MMOL/L (ref 3.5–5.1)
PROT SERPL-MCNC: 7.4 GM/DL (ref 6.4–8.3)
PROT UR QL STRIP: NEGATIVE
RBC # BLD AUTO: 4.72 X10(6)/MCL (ref 4.7–6.1)
RBC #/AREA URNS AUTO: ABNORMAL /HPF
SODIUM SERPL-SCNC: 139 MMOL/L (ref 136–145)
SP GR UR STRIP.AUTO: 1.03 (ref 1–1.03)
SQUAMOUS #/AREA URNS LPF: ABNORMAL /HPF
UROBILINOGEN UR STRIP-ACNC: NORMAL
WBC # BLD AUTO: 6.11 X10(3)/MCL (ref 4.5–11.5)
WBC #/AREA URNS AUTO: ABNORMAL /HPF

## 2024-12-15 PROCEDURE — 96361 HYDRATE IV INFUSION ADD-ON: CPT

## 2024-12-15 PROCEDURE — 85025 COMPLETE CBC W/AUTO DIFF WBC: CPT

## 2024-12-15 PROCEDURE — 81001 URINALYSIS AUTO W/SCOPE: CPT

## 2024-12-15 PROCEDURE — 96374 THER/PROPH/DIAG INJ IV PUSH: CPT | Mod: 59

## 2024-12-15 PROCEDURE — 63600175 PHARM REV CODE 636 W HCPCS: Performed by: STUDENT IN AN ORGANIZED HEALTH CARE EDUCATION/TRAINING PROGRAM

## 2024-12-15 PROCEDURE — 99285 EMERGENCY DEPT VISIT HI MDM: CPT | Mod: 25

## 2024-12-15 PROCEDURE — 80053 COMPREHEN METABOLIC PANEL: CPT

## 2024-12-15 PROCEDURE — 96375 TX/PRO/DX INJ NEW DRUG ADDON: CPT

## 2024-12-15 PROCEDURE — 25500020 PHARM REV CODE 255: Performed by: STUDENT IN AN ORGANIZED HEALTH CARE EDUCATION/TRAINING PROGRAM

## 2024-12-15 PROCEDURE — 83605 ASSAY OF LACTIC ACID: CPT | Performed by: STUDENT IN AN ORGANIZED HEALTH CARE EDUCATION/TRAINING PROGRAM

## 2024-12-15 PROCEDURE — 83690 ASSAY OF LIPASE: CPT

## 2024-12-15 RX ORDER — ONDANSETRON HYDROCHLORIDE 2 MG/ML
4 INJECTION, SOLUTION INTRAVENOUS
Status: COMPLETED | OUTPATIENT
Start: 2024-12-15 | End: 2024-12-15

## 2024-12-15 RX ORDER — HYDROMORPHONE HYDROCHLORIDE 2 MG/ML
1 INJECTION, SOLUTION INTRAMUSCULAR; INTRAVENOUS; SUBCUTANEOUS
Status: COMPLETED | OUTPATIENT
Start: 2024-12-15 | End: 2024-12-15

## 2024-12-15 RX ORDER — PREDNISONE 20 MG/1
40 TABLET ORAL DAILY
Qty: 10 TABLET | Refills: 0 | Status: SHIPPED | OUTPATIENT
Start: 2024-12-15 | End: 2024-12-20

## 2024-12-15 RX ORDER — MORPHINE SULFATE 4 MG/ML
4 INJECTION, SOLUTION INTRAMUSCULAR; INTRAVENOUS
Status: DISCONTINUED | OUTPATIENT
Start: 2024-12-15 | End: 2024-12-15

## 2024-12-15 RX ORDER — HYDROCODONE BITARTRATE AND ACETAMINOPHEN 10; 325 MG/1; MG/1
1 TABLET ORAL EVERY 6 HOURS PRN
Qty: 12 TABLET | Refills: 0 | Status: SHIPPED | OUTPATIENT
Start: 2024-12-15

## 2024-12-15 RX ORDER — ONDANSETRON 4 MG/1
4 TABLET, ORALLY DISINTEGRATING ORAL EVERY 6 HOURS PRN
Qty: 12 TABLET | Refills: 0 | Status: SHIPPED | OUTPATIENT
Start: 2024-12-15

## 2024-12-15 RX ADMIN — IOHEXOL 100 ML: 350 INJECTION, SOLUTION INTRAVENOUS at 01:12

## 2024-12-15 RX ADMIN — HYDROMORPHONE HYDROCHLORIDE 1 MG: 2 INJECTION INTRAMUSCULAR; INTRAVENOUS; SUBCUTANEOUS at 01:12

## 2024-12-15 RX ADMIN — SODIUM CHLORIDE, POTASSIUM CHLORIDE, SODIUM LACTATE AND CALCIUM CHLORIDE 1000 ML: 600; 310; 30; 20 INJECTION, SOLUTION INTRAVENOUS at 01:12

## 2024-12-15 RX ADMIN — ONDANSETRON 4 MG: 2 INJECTION INTRAMUSCULAR; INTRAVENOUS at 01:12

## 2024-12-15 NOTE — ED PROVIDER NOTES
Encounter Date: 12/15/2024    SCRIBE #1 NOTE: I, Kamaljit Rogers, am scribing for, and in the presence of,  Dr. Khalil. I have scribed the following portions of the note - Other sections scribed: HPI, ROS, Physical Exam, MDM, Attending.       History     Chief Complaint   Patient presents with    Abdominal Pain     Pt reports abd pain N/V/D since Friday with generalized weakness and fatigue. Hx of Crohns. VSS, NAD in triage.     50 y/o male with history of HTN, Crohn's and DVT presents to ED for R-sided abdominal pain, nausea, vomiting and diarrhea onset 2 days ago.  Pt also notes subjective fever last night.  He states he is not on treatment right now and has been feeling well with no episodes of pain for the past couple months with is Crohn's.  This pain is similar to the pain he typically has with Crohn's flares.  He denies blood in stool, dysuria or hematuria.  GI is Dr. López.      The history is provided by the patient.     Review of patient's allergies indicates:   Allergen Reactions    Ketorolac Swelling    Nalbuphine     Dicyclomine Rash     Other reaction(s): decrease in LOC    Tramadol-acetaminophen Rash     Past Medical History:   Diagnosis Date    Crohn disease     DVT (deep venous thrombosis)     Hypertension      Past Surgical History:   Procedure Laterality Date    BOWEL RESECTION      CHOLECYSTECTOMY       No family history on file.  Social History     Tobacco Use    Smoking status: Never    Smokeless tobacco: Never   Substance Use Topics    Alcohol use: Not Currently    Drug use: Never     Review of Systems   Constitutional:  Positive for fever (subjective).   Gastrointestinal:  Positive for abdominal pain, diarrhea, nausea and vomiting. Negative for blood in stool.   Genitourinary:  Negative for dysuria and hematuria.       Physical Exam     Initial Vitals [12/15/24 1228]   BP Pulse Resp Temp SpO2   118/80 (!) 112 14 98.7 °F (37.1 °C) 95 %      MAP       --         Physical Exam    Nursing  note and vitals reviewed.  Constitutional: He appears well-developed.   Eyes: EOM are normal. Pupils are equal, round, and reactive to light.   Cardiovascular:  Normal rate and regular rhythm.           No murmur heard.  Palpable pulses    Pulmonary/Chest: Breath sounds normal. No respiratory distress. He has no wheezes. He has no rales.   Abdominal: Abdomen is soft. He exhibits no distension. There is abdominal tenderness (RLQ).   Well-healed surgical scar  There is no rebound and no guarding.     Neurological: He is alert and oriented to person, place, and time.   Moves all extremities    Skin: Skin is warm. Capillary refill takes less than 2 seconds. No rash noted.         ED Course   Procedures  Labs Reviewed   COMPREHENSIVE METABOLIC PANEL - Abnormal       Result Value    Sodium 139      Potassium 3.7      Chloride 106      CO2 26      Glucose 123 (*)     Blood Urea Nitrogen 14.5      Creatinine 0.87      Calcium 9.4      Protein Total 7.4      Albumin 4.1      Globulin 3.3      Albumin/Globulin Ratio 1.2      Bilirubin Total 0.5      ALP 76      ALT 20      AST 20      eGFR >60      Anion Gap 7.0      BUN/Creatinine Ratio 17     URINALYSIS, REFLEX TO URINE CULTURE - Abnormal    Color, UA Yellow      Appearance, UA Clear      Specific Gravity, UA 1.026      pH, UA 5.5      Protein, UA Negative      Glucose, UA Normal      Ketones, UA Negative      Blood, UA Negative      Bilirubin, UA Negative      Urobilinogen, UA Normal      Nitrites, UA Negative      Leukocyte Esterase, UA Negative      RBC, UA 0-5      WBC, UA 0-5      Bacteria, UA None Seen      Squamous Epithelial Cells, UA Trace      Mucous, UA Trace (*)     Hyaline Casts, UA 3-5 (*)    CBC WITH DIFFERENTIAL - Abnormal    WBC 6.11      RBC 4.72      Hgb 13.9 (*)     Hct 41.1 (*)     MCV 87.1      MCH 29.4      MCHC 33.8      RDW 13.4      Platelet 327      MPV 9.4      Neut % 62.0      Lymph % 27.8      Mono % 8.8      Eos % 0.5      Basophil % 0.7       Lymph # 1.70      Neut # 3.79      Mono # 0.54      Eos # 0.03      Baso # 0.04      IG# 0.01      IG% 0.2      NRBC% 0.0     LIPASE - Normal    Lipase Level 9     LACTIC ACID, PLASMA - Normal    Lactic Acid Level 1.6     CBC W/ AUTO DIFFERENTIAL    Narrative:     The following orders were created for panel order CBC W/ AUTO DIFFERENTIAL.  Procedure                               Abnormality         Status                     ---------                               -----------         ------                     CBC with Differential[[id number]]       Abnormal            Final result                 Please view results for these tests on the individual orders.          Imaging Results              CT Abdomen Pelvis With IV Contrast NO Oral Contrast (Final result)  Result time 12/15/24 14:17:52      Final result by Leah Rodriguez MD (12/15/24 14:17:52)                   Impression:      Findings suggestive of a mild colitis involving the descending colon.  No drainable fluid collection or free air.      Electronically signed by: Leah Rodriguez  Date:    12/15/2024  Time:    14:17               Narrative:    EXAMINATION:  CT ABDOMEN PELVIS WITH IV CONTRAST    CLINICAL HISTORY:  Nausea/vomiting;Abdominal pain, acute, nonlocalized;    TECHNIQUE:  CT imaging was performed of the abdomen and pelvis after the administration of intravenous contrast. Dose length product is 398 mGycm. Automatic exposure control, adjustment of mA/kV or iterative reconstruction technique was used to limit radiation dose.    COMPARISON:  CT abdomen pelvis dated 04/25/2024    FINDINGS:  Liver: Normal.    Gallbladder and biliary tree: The gallbladder has been surgically resected.  No intra or extrahepatic biliary ductal dilation.    Pancreas: Normal.    Spleen: Punctate splenic calcified granulomas.    Adrenals: Normal.    Kidneys and ureters: There are punctate right renal calculi.  There is no hydronephrosis.    Bladder:  Normal.    Reproductive organs: No pelvic masses.    Stomach/bowel: There is mild wall thickening of the descending colon with mild adjacent inflammatory changes.    Lymph nodes: No pathologically enlarged lymph node identified.    Peritoneum: No ascites or free air. No fluid collection.    Vessels: Mild scattered vascular calcifications.    Abdominal wall: Normal.    Lung bases: No consolidation or pleural effusion.    Bones: No acute osseous findings.                                       Medications   lactated ringers bolus 1,000 mL (0 mLs Intravenous Stopped 12/15/24 1426)   ondansetron injection 4 mg (4 mg Intravenous Given 12/15/24 1325)   iohexoL (OMNIPAQUE 350) injection 100 mL (100 mLs Intravenous Given 12/15/24 1349)   HYDROmorphone (PF) injection 1 mg (1 mg Intravenous Given 12/15/24 1351)     Medical Decision Making  Problems Addressed:  Acute generalized abdominal pain: acute illness or injury  Crohn's disease without complication, unspecified gastrointestinal tract location: acute illness or injury    Amount and/or Complexity of Data Reviewed  Labs:  Decision-making details documented in ED Course.  Radiology: ordered.    Risk  Prescription drug management.    Differential diagnosis (includes but is not limited to):   Crohn's flare, colitis, intra-abdominal infection, abscess, fluid collection, dehydration, kidney injury, electrolyte abnormalities    MDM Narrative  49-year-old male presents for evaluation of abdominal pain associated with some nausea and vomiting as well as some diarrhea over the past couple of days.  Labs reviewed, overall reassuring.  However, patient did have some tenderness on exam.  CT of the abdomen pelvis performed, appears to be consistent with a mild Crohn's flare.  Discussed disposition and treatment and engaged in shared decision-making with the patient.  Offered coverage with steroids, patient agreeable stating these typically help his flares.  I did also offer antibiotics  however he states he typically has significant side effects and would prefer to hold off on antibiotics at this time until he can follow up with the GI physician.  He states he will follow up with the GI physician for further evaluation management of his symptoms.  Strict return precautions discussed the patient has verbalized understanding.  Patient remains afebrile and hemodynamically stable.  Patient is tolerating oral intake.  Patient states he is ready to go home.  Prescriptions provided.    Dispo: Discharge    My independent radiology interpretation: as above  Point of care US (independently performed and interpreted):   Decision rules/clinical scoring:     Sepsis Perfusion Assessment:     Amount and/or Complexity of Data Reviewed  Independent historian: none   Summary of history:   External data reviewed: notes from previous ED visits and notes from clinic visits  Summary of data reviewed: Prior records reviewed  Risk and benefits of testing: discussed   Labs: ordered and reviewed  Radiology: ordered and independent interpretation performed (see above or ED course)  ECG/medicine tests: ordered and independent interpretation performed (see above or ED course)  Discussion of management or test interpretation with external provider(s): none   Summary of discussion:     Risk  OTC medications  Prescription drug management   Shared decision making     Critical Care  none    Data Reviewed/Counseling: I have personally reviewed the patient's vital signs, nursing notes, and other relevant tests, information, and imaging. I had a detailed discussion regarding the historical points, exam findings, and any diagnostic results supporting the discharge diagnosis. I personally performed the history, PE, MDM and procedures as documented above and agree with the scribe's documentation.    Portions of this note were dictated using voice recognition software. Although it was reviewed for accuracy, some inherent voice recognition  errors may have occurred and may be present in this document.          Scribe Attestation:   Scribe #1: I performed the above scribed service and the documentation accurately describes the services I performed. I attest to the accuracy of the note.    Attending Attestation:           Physician Attestation for Scribe:  Physician Attestation Statement for Scribe #1: I, reviewed documentation, as scribed by Kamaljit Rogers in my presence, and it is both accurate and complete.             ED Course as of 24 1839   Sun Dec 15, 2024   1303 WBC: 6.11 [MC]   1315 Lactic Acid Level: 1.6 [MC]   1513 I have reassessed the patient.  Patient is resting comfortably, no acute distress.  Vital signs stable.  Discussed all results including incidental findings.  Discussed need for follow up and discussed return precautions.  Answered all questions at this time.  Hemodynamically stable for continued outpatient management. Patient verbalized understanding and agreed to plan.    [MC]      ED Course User Index  [MC] Izaiah Khalil MD                           Clinical Impression:  Final diagnoses:  [K50.90] Crohn's disease without complication, unspecified gastrointestinal tract location (Primary)  [R10.84] Acute generalized abdominal pain          ED Disposition Condition    Discharge Stable          ED Prescriptions       Medication Sig Dispense Start Date End Date Auth. Provider    predniSONE (DELTASONE) 20 MG tablet () Take 2 tablets (40 mg total) by mouth once daily. for 5 days 10 tablet 12/15/2024 2024 Izaiah Khalil MD    ondansetron (ZOFRAN-ODT) 4 MG TbDL Take 1 tablet (4 mg total) by mouth every 6 (six) hours as needed (Nausea). 12 tablet 12/15/2024 -- Izaiah Khalil MD    HYDROcodone-acetaminophen (NORCO)  mg per tablet Take 1 tablet by mouth every 6 (six) hours as needed for Pain. 12 tablet 12/15/2024 -- Izaiah Khalil MD          Follow-up Information       Follow up With  Specialties Details Why Contact Info    Jong Holley MD Gastroenterology Schedule an appointment as soon as possible for a visit   [address] [facility] [place] [facility] [address]  [phone]      Your PCP  In 2 days      [facility] - Emergency Dept Emergency Medicine  If symptoms worsen [address] [facility] [id number] [phone]             Izaiah Khalil MD  12/31/24 0776

## 2024-12-15 NOTE — DISCHARGE INSTRUCTIONS

## 2025-05-17 ENCOUNTER — HOSPITAL ENCOUNTER (EMERGENCY)
Facility: HOSPITAL | Age: 50
Discharge: HOME | End: 2025-05-17
Attending: STUDENT IN AN ORGANIZED HEALTH CARE EDUCATION/TRAINING PROGRAM
Payer: MEDICARE

## 2025-05-17 VITALS
WEIGHT: 150 LBS | TEMPERATURE: 98 F | DIASTOLIC BLOOD PRESSURE: 94 MMHG | HEART RATE: 90 BPM | BODY MASS INDEX: 23.54 KG/M2 | SYSTOLIC BLOOD PRESSURE: 123 MMHG | RESPIRATION RATE: 11 BRPM | OXYGEN SATURATION: 94 % | HEIGHT: 67 IN

## 2025-05-17 DIAGNOSIS — R10.9 ABDOMINAL PAIN, UNSPECIFIED ABDOMINAL LOCATION: Primary | ICD-10-CM

## 2025-05-17 DIAGNOSIS — R19.7 DIARRHEA, UNSPECIFIED TYPE: ICD-10-CM

## 2025-05-17 DIAGNOSIS — K50.90 CROHN'S DISEASE WITHOUT COMPLICATION, UNSPECIFIED GASTROINTESTINAL TRACT LOCATION: ICD-10-CM

## 2025-05-17 LAB
ALBUMIN SERPL-MCNC: 4.3 G/DL (ref 3.5–5)
ALBUMIN/GLOB SERPL: 1.2 RATIO (ref 1.1–2)
ALP SERPL-CCNC: 80 UNIT/L (ref 40–150)
ALT SERPL-CCNC: 26 UNIT/L (ref 0–55)
ANION GAP SERPL CALC-SCNC: 10 MEQ/L
AST SERPL-CCNC: 24 UNIT/L (ref 11–45)
BACTERIA #/AREA URNS AUTO: ABNORMAL /HPF
BASOPHILS # BLD AUTO: 0.03 X10(3)/MCL
BASOPHILS NFR BLD AUTO: 0.6 %
BILIRUB SERPL-MCNC: 0.6 MG/DL
BILIRUB UR QL STRIP.AUTO: NEGATIVE
BUN SERPL-MCNC: 11.3 MG/DL (ref 8.9–20.6)
CALCIUM SERPL-MCNC: 9.1 MG/DL (ref 8.4–10.2)
CHLORIDE SERPL-SCNC: 105 MMOL/L (ref 98–107)
CLARITY UR: CLEAR
CO2 SERPL-SCNC: 23 MMOL/L (ref 22–29)
COLOR UR AUTO: ABNORMAL
CREAT SERPL-MCNC: 0.79 MG/DL (ref 0.72–1.25)
CREAT/UREA NIT SERPL: 14
EOSINOPHIL # BLD AUTO: 0.05 X10(3)/MCL (ref 0–0.9)
EOSINOPHIL NFR BLD AUTO: 0.9 %
ERYTHROCYTE [DISTWIDTH] IN BLOOD BY AUTOMATED COUNT: 13.3 % (ref 11.5–17)
GFR SERPLBLD CREATININE-BSD FMLA CKD-EPI: >60 ML/MIN/1.73/M2
GLOBULIN SER-MCNC: 3.7 GM/DL (ref 2.4–3.5)
GLUCOSE SERPL-MCNC: 87 MG/DL (ref 74–100)
GLUCOSE UR QL STRIP: NORMAL
HCT VFR BLD AUTO: 43.9 % (ref 42–52)
HGB BLD-MCNC: 14.4 G/DL (ref 14–18)
HGB UR QL STRIP: NEGATIVE
IMM GRANULOCYTES # BLD AUTO: 0.01 X10(3)/MCL (ref 0–0.04)
IMM GRANULOCYTES NFR BLD AUTO: 0.2 %
KETONES UR QL STRIP: NEGATIVE
LEUKOCYTE ESTERASE UR QL STRIP: NEGATIVE
LIPASE SERPL-CCNC: 12 U/L
LYMPHOCYTES # BLD AUTO: 1.43 X10(3)/MCL (ref 0.6–4.6)
LYMPHOCYTES NFR BLD AUTO: 26.4 %
MCH RBC QN AUTO: 28.9 PG (ref 27–31)
MCHC RBC AUTO-ENTMCNC: 32.8 G/DL (ref 33–36)
MCV RBC AUTO: 88 FL (ref 80–94)
MONOCYTES # BLD AUTO: 0.54 X10(3)/MCL (ref 0.1–1.3)
MONOCYTES NFR BLD AUTO: 10 %
MUCOUS THREADS URNS QL MICRO: ABNORMAL /LPF
NEUTROPHILS # BLD AUTO: 3.36 X10(3)/MCL (ref 2.1–9.2)
NEUTROPHILS NFR BLD AUTO: 61.9 %
NITRITE UR QL STRIP: NEGATIVE
NRBC BLD AUTO-RTO: 0 %
PH UR STRIP: 5.5 [PH]
PLATELET # BLD AUTO: 325 X10(3)/MCL (ref 130–400)
PMV BLD AUTO: 9.4 FL (ref 7.4–10.4)
POTASSIUM SERPL-SCNC: 3.4 MMOL/L (ref 3.5–5.1)
PROT SERPL-MCNC: 8 GM/DL (ref 6.4–8.3)
PROT UR QL STRIP: NEGATIVE
RBC # BLD AUTO: 4.99 X10(6)/MCL (ref 4.7–6.1)
RBC #/AREA URNS AUTO: ABNORMAL /HPF
SODIUM SERPL-SCNC: 138 MMOL/L (ref 136–145)
SP GR UR STRIP.AUTO: 1.03 (ref 1–1.03)
SQUAMOUS #/AREA URNS LPF: ABNORMAL /HPF
UROBILINOGEN UR STRIP-ACNC: NORMAL
WBC # BLD AUTO: 5.42 X10(3)/MCL (ref 4.5–11.5)
WBC #/AREA URNS AUTO: ABNORMAL /HPF

## 2025-05-17 PROCEDURE — 96374 THER/PROPH/DIAG INJ IV PUSH: CPT

## 2025-05-17 PROCEDURE — 63600175 PHARM REV CODE 636 W HCPCS: Performed by: NURSE PRACTITIONER

## 2025-05-17 PROCEDURE — 83690 ASSAY OF LIPASE: CPT | Performed by: NURSE PRACTITIONER

## 2025-05-17 PROCEDURE — 25000003 PHARM REV CODE 250: Performed by: NURSE PRACTITIONER

## 2025-05-17 PROCEDURE — 99285 EMERGENCY DEPT VISIT HI MDM: CPT | Mod: 25

## 2025-05-17 PROCEDURE — 25500020 PHARM REV CODE 255: Performed by: NURSE PRACTITIONER

## 2025-05-17 PROCEDURE — 96375 TX/PRO/DX INJ NEW DRUG ADDON: CPT

## 2025-05-17 PROCEDURE — 80053 COMPREHEN METABOLIC PANEL: CPT | Performed by: NURSE PRACTITIONER

## 2025-05-17 PROCEDURE — 85025 COMPLETE CBC W/AUTO DIFF WBC: CPT | Performed by: NURSE PRACTITIONER

## 2025-05-17 PROCEDURE — 81001 URINALYSIS AUTO W/SCOPE: CPT | Performed by: NURSE PRACTITIONER

## 2025-05-17 PROCEDURE — 96361 HYDRATE IV INFUSION ADD-ON: CPT

## 2025-05-17 RX ORDER — SODIUM CHLORIDE 9 MG/ML
1000 INJECTION, SOLUTION INTRAVENOUS
Status: COMPLETED | OUTPATIENT
Start: 2025-05-17 | End: 2025-05-17

## 2025-05-17 RX ORDER — PREDNISONE 20 MG/1
40 TABLET ORAL DAILY
Qty: 10 TABLET | Refills: 0 | Status: SHIPPED | OUTPATIENT
Start: 2025-05-17 | End: 2025-05-22

## 2025-05-17 RX ORDER — HYDROCODONE BITARTRATE AND ACETAMINOPHEN 10; 325 MG/1; MG/1
1 TABLET ORAL EVERY 6 HOURS PRN
Qty: 15 TABLET | Refills: 0 | Status: SHIPPED | OUTPATIENT
Start: 2025-05-17

## 2025-05-17 RX ORDER — ONDANSETRON 4 MG/1
4 TABLET, FILM COATED ORAL EVERY 8 HOURS PRN
Qty: 20 TABLET | Refills: 0 | Status: SHIPPED | OUTPATIENT
Start: 2025-05-17

## 2025-05-17 RX ORDER — LISINOPRIL 10 MG/1
10 TABLET ORAL DAILY
COMMUNITY

## 2025-05-17 RX ORDER — HYDROMORPHONE HYDROCHLORIDE 2 MG/ML
1 INJECTION, SOLUTION INTRAMUSCULAR; INTRAVENOUS; SUBCUTANEOUS
Refills: 0 | Status: COMPLETED | OUTPATIENT
Start: 2025-05-17 | End: 2025-05-17

## 2025-05-17 RX ORDER — ONDANSETRON HYDROCHLORIDE 2 MG/ML
4 INJECTION, SOLUTION INTRAVENOUS
Status: COMPLETED | OUTPATIENT
Start: 2025-05-17 | End: 2025-05-17

## 2025-05-17 RX ADMIN — IOHEXOL 100 ML: 350 INJECTION, SOLUTION INTRAVENOUS at 03:05

## 2025-05-17 RX ADMIN — HYDROMORPHONE HYDROCHLORIDE 1 MG: 2 INJECTION INTRAMUSCULAR; INTRAVENOUS; SUBCUTANEOUS at 02:05

## 2025-05-17 RX ADMIN — SODIUM CHLORIDE 1000 ML: 9 INJECTION, SOLUTION INTRAVENOUS at 11:05

## 2025-05-17 RX ADMIN — SODIUM CHLORIDE 1000 ML: 9 INJECTION, SOLUTION INTRAVENOUS at 02:05

## 2025-05-17 RX ADMIN — ONDANSETRON 4 MG: 2 INJECTION INTRAMUSCULAR; INTRAVENOUS at 11:05

## 2025-05-17 NOTE — ED PROVIDER NOTES
Encounter Date: 5/17/2025       History     Chief Complaint   Patient presents with    Abdominal Pain     Pt reports lower abdominal pain x 1.5 weeks, stabbing sensation. Pt reports he is having a chron's flare up with diarrhea. Denies fever.      Patient states lower abdominal pain times 1-2 weeks.  Patient states nausea and diarrhea.  Denies any bloody or dark stools.  Patient denies any vomiting.  Patient denies any fever.  Patient states that pain is constant.  Patient states a history of Crohn's and that this feels like a Crohn's flare-up.  Past medical history of Crohn's and hypertension.    The history is provided by the patient.   Abdominal Pain  Illness onset: Two weeks. The onset of the illness was gradual. The problem has not changed since onset.Pain radiation: Bilateral lower abdominal. The severity of the abdominal pain is 8/10. The abdominal pain is relieved by nothing. The other symptoms of the illness include nausea and diarrhea. The other symptoms of the illness do not include fever, vomiting or dysuria.   Symptoms associated with the illness do not include chills. Significant associated medical issues include inflammatory bowel disease.     Review of patient's allergies indicates:   Allergen Reactions    Ketorolac Swelling    Nalbuphine     Dicyclomine Rash     Other reaction(s): decrease in LOC    Tramadol-acetaminophen Rash     Past Medical History:   Diagnosis Date    Crohn disease     DVT (deep venous thrombosis)     Hypertension      Past Surgical History:   Procedure Laterality Date    BOWEL RESECTION      CHOLECYSTECTOMY       No family history on file.  Social History[1]  Review of Systems   Constitutional: Negative.  Negative for chills and fever.   HENT: Negative.     Eyes: Negative.    Respiratory: Negative.     Cardiovascular: Negative.    Gastrointestinal:  Positive for abdominal pain, diarrhea and nausea. Negative for blood in stool and vomiting.   Endocrine: Negative.     Genitourinary: Negative.  Negative for dysuria.   Musculoskeletal: Negative.    Skin: Negative.    Allergic/Immunologic: Negative.    Neurological: Negative.  Negative for weakness.   Hematological: Negative.    Psychiatric/Behavioral: Negative.     All other systems reviewed and are negative.      Physical Exam     Initial Vitals [05/17/25 1101]   BP Pulse Resp Temp SpO2   128/79 96 18 98.3 °F (36.8 °C) 98 %      MAP       --         Physical Exam    Nursing note and vitals reviewed.  Constitutional: He appears well-developed and well-nourished. No distress.   HENT:   Head: Normocephalic and atraumatic. Mouth/Throat: Oropharynx is clear and moist.   Eyes: Conjunctivae and EOM are normal. Pupils are equal, round, and reactive to light.   Neck: Neck supple.   Normal range of motion.  Cardiovascular:  Normal rate, regular rhythm, normal heart sounds and intact distal pulses.           Pulmonary/Chest: Breath sounds normal. No respiratory distress. He has no wheezes.   Abdominal: Abdomen is soft. Bowel sounds are normal. He exhibits no distension. There is abdominal tenderness (Bilateral lower abdominal.).   Musculoskeletal:         General: No edema. Normal range of motion.      Cervical back: Normal range of motion and neck supple.     Neurological: He is alert and oriented to person, place, and time. He has normal strength. GCS score is 15. GCS eye subscore is 4. GCS verbal subscore is 5. GCS motor subscore is 6.   Skin: Skin is warm and dry. No rash noted.   Psychiatric: He has a normal mood and affect. Thought content normal.         ED Course   Procedures  Labs Reviewed   COMPREHENSIVE METABOLIC PANEL - Abnormal       Result Value    Sodium 138      Potassium 3.4 (*)     Chloride 105      CO2 23      Glucose 87      Blood Urea Nitrogen 11.3      Creatinine 0.79      Calcium 9.1      Protein Total 8.0      Albumin 4.3      Globulin 3.7 (*)     Albumin/Globulin Ratio 1.2      Bilirubin Total 0.6      ALP 80       ALT 26      AST 24      eGFR >60      Anion Gap 10.0      BUN/Creatinine Ratio 14     URINALYSIS, REFLEX TO URINE CULTURE - Abnormal    Color, UA Light-Yellow      Appearance, UA Clear      Specific Gravity, UA 1.026      pH, UA 5.5      Protein, UA Negative      Glucose, UA Normal      Ketones, UA Negative      Blood, UA Negative      Bilirubin, UA Negative      Urobilinogen, UA Normal      Nitrites, UA Negative      Leukocyte Esterase, UA Negative      RBC, UA 0-5      WBC, UA 0-5      Bacteria, UA None Seen      Squamous Epithelial Cells, UA None Seen      Mucous, UA Occasional (*)    CBC WITH DIFFERENTIAL - Abnormal    WBC 5.42      RBC 4.99      Hgb 14.4      Hct 43.9      MCV 88.0      MCH 28.9      MCHC 32.8 (*)     RDW 13.3      Platelet 325      MPV 9.4      Neut % 61.9      Lymph % 26.4      Mono % 10.0      Eos % 0.9      Basophil % 0.6      Imm Grans % 0.2      Neut # 3.36      Lymph # 1.43      Mono # 0.54      Eos # 0.05      Baso # 0.03      Imm Gran # 0.01      NRBC% 0.0     LIPASE - Normal    Lipase Level 12     CBC W/ AUTO DIFFERENTIAL    Narrative:     The following orders were created for panel order CBC Auto Differential.  Procedure                               Abnormality         Status                     ---------                               -----------         ------                     CBC with Differential[[id number]]       Abnormal            Final result                 Please view results for these tests on the individual orders.          Imaging Results              CT Abdomen Pelvis With IV Contrast NO Oral Contrast (Final result)  Result time 05/17/25 15:23:45      Final result by Frances Suggs MD (05/17/25 15:23:45)                   Impression:      Findings compatible with nonspecific colitis      Electronically signed by: Frances Suggs  Date:    05/17/2025  Time:    15:23               Narrative:    EXAMINATION:  CT ABDOMEN PELVIS WITH IV CONTRAST    CLINICAL  HISTORY:  Abdominal pain, acute, nonlocalized;  lower abdominal pain x1.5 weeks.  Crohn's    TECHNIQUE:  Helically acquired images with axial, sagittal and coronal reformations were obtained from the lung bases to the pubic symphysis after the IV administration of contrast.    Automated tube current modulation, weight-based exposure dosing, and/or iterative reconstruction technique utilized to reach lowest reasonably achievable exposure rate.    DLP: 366 mGy*cm    COMPARISON:  CT abdomen pelvis 12/15/2024    FINDINGS:  HEART: Normal in size. No pericardial effusion.    LUNG BASES: Well aerated.    LIVER: Unchanged small enhancing lesion the right lobe of the liver, possibly flash fill hemangioma.    BILIARY: Gallbladder is surgically absent.    PANCREAS: No inflammatory change.    SPLEEN: Normal in size    ADRENALS: No mass.    KIDNEYS/URETERS: Punctate 2-3 mm nonobstructing right renal caliceal calculi.  Incidental renal cysts.  No imaging follow-up necessary.  No hydronephrosis.    GI TRACT/MESENTERY: Small and large bowel loops are fluid-filled without evidence of obstruction.  There is mild colonic wall thickening and pericolonic fat stranding.    PERITONEUM: No free fluid.No free air.    LYMPH NODES: No enlarged lymph nodes by size criteria.    VASCULATURE: No significant atherosclerosis or aneurysm.    BLADDER: Normal appearance given degree of distention.    REPRODUCTIVE ORGANS: Normal as visualized.    SOFT TISSUES: Unremarkable.    BONES: No acute osseous abnormality.                                       Medications   0.9% NaCl infusion (0 mLs Intravenous Stopped 5/17/25 1438)   ondansetron injection 4 mg (4 mg Intravenous Given 5/17/25 1121)   0.9% NaCl infusion (1,000 mLs Intravenous New Bag 5/17/25 1441)   HYDROmorphone (PF) injection 1 mg (1 mg Intravenous Given 5/17/25 1442)   iohexoL (OMNIPAQUE 350) injection 100 mL (100 mLs Intravenous Given 5/17/25 1517)     Medical Decision Making  Patient states  lower abdominal pain times 1-2 weeks.  Patient states nausea and diarrhea.  Denies any bloody or dark stools.  Patient denies any vomiting.  Patient denies any fever.  Patient states that pain is constant.  Patient states a history of Crohn's and that this feels like a Crohn's flare-up.  Past medical history of Crohn's and hypertension.    The history is provided by the patient.   Abdominal Pain  Illness onset: Two weeks. The onset of the illness was gradual. The problem has not changed since onset.Pain radiation: Bilateral lower abdominal. The severity of the abdominal pain is 8/10. The abdominal pain is relieved by nothing. The other symptoms of the illness include nausea and diarrhea. The other symptoms of the illness do not include fever, vomiting or dysuria.   Symptoms associated with the illness do not include chills. Significant associated medical issues include inflammatory bowel disease.       Amount and/or Complexity of Data Reviewed  Labs: ordered. Decision-making details documented in ED Course.  Radiology: ordered. Decision-making details documented in ED Course.  Discussion of management or test interpretation with external provider(s): Differential diagnosis (including but not limited to):   Judging by the patient's chief complaint and pertinent history, the patient has the following possible differential diagnoses, including but not limited to the following.  Some of these are deemed to be lower likelihood and some more likely based on my physical exam and history combined with possible lab work and/or imaging studies.   Please see the pertinent studies, and refer to the HPI.  Some of these diagnoses will take further evaluation to fully rule out, perhaps as an outpatient and the patient was encouraged to follow up when discharged for more comprehensive evaluation.  Abdominal pain, Crohn's flare-up, colitis, gastroenteritis  Patient's labs are overall unremarkable.  Patient's CT scan of his abdomen  and pelvis shows a nonspecific colitis.  Patient was given normal saline IV in the ED.  Patient was given nausea and pain control in the ED. patient states that pain and nausea is relieved.  Patient states that he feels improved.  We will discharge patient home with pain control and a course of steroids.  Patient states that he does have a follow up appointment with his GI doctor in 2 weeks.  Instructed patient to keep appointment.  Patient was given strict ED return precautions.      Risk  Prescription drug management.               ED Course as of 05/17/25 1649   Sat May 17, 2025   1634 Comprehensive metabolic panel(!) [AB]   1634 CBC Auto Differential(!) [AB]   1634 Lipase [AB]   1634 Lipase: 12 [AB]   1634 Urinalysis, Reflex to Urine Culture Urine, Clean Catch(!) [AB]   1635 CT Abdomen Pelvis With IV Contrast NO Oral Contrast  Findings compatible with nonspecific colitis. [AB]      ED Course User Index  [AB] Tiesha Barlow FNP                           Clinical Impression:  Final diagnoses:  [R10.9] Abdominal pain, unspecified abdominal location (Primary)  [R19.7] Diarrhea, unspecified type  [K50.90] Crohn's disease without complication, unspecified gastrointestinal tract location          ED Disposition Condition    Discharge Stable          ED Prescriptions       Medication Sig Dispense Start Date End Date Auth. Provider    predniSONE (DELTASONE) 20 MG tablet Take 2 tablets (40 mg total) by mouth once daily. for 5 days 10 tablet 5/17/2025 5/22/2025 Tiesha Barlow FNP    HYDROcodone-acetaminophen (NORCO)  mg per tablet Take 1 tablet by mouth every 6 (six) hours as needed for Pain. 15 tablet 5/17/2025 -- Tiesha Barlow FNP    ondansetron (ZOFRAN) 4 MG tablet Take 1 tablet (4 mg total) by mouth every 8 (eight) hours as needed for Nausea. 20 tablet 5/17/2025 -- Tiesha Barlow FNP          Follow-up Information       Follow up With Specialties Details Why Contact Info    Primary Care Provider  In 3  days      Tyler Valadez MD Gastroenterology In 3 days  [address] [facility] [address]  [phone]                   [1]   Social History  Tobacco Use    Smoking status: Never    Smokeless tobacco: Never   Substance Use Topics    Alcohol use: Not Currently    Drug use: Never        Tiesha Barlow, [facility]  05/17/25 0955

## 2025-05-17 NOTE — FIRST PROVIDER EVALUATION
Medical screening examination initiated.  I have conducted a focused provider triage encounter, findings are as follows:    Brief history of present illness:  50y/o M presents to the ED reports abdominal pain from chron's flare up. Onset 1 week with diarrhea.    There were no vitals filed for this visit.    Pertinent physical exam:  AAA x 3    Brief workup plan:  Labs    Preliminary workup initiated; this workup will be continued and followed by the physician or advanced practice provider that is assigned to the patient when roomed.

## 2025-06-19 ENCOUNTER — HOSPITAL ENCOUNTER (OUTPATIENT)
Facility: HOSPITAL | Age: 50
Discharge: HOME | End: 2025-06-21
Attending: EMERGENCY MEDICINE | Admitting: INTERNAL MEDICINE
Payer: MEDICARE

## 2025-06-19 DIAGNOSIS — M79.643: ICD-10-CM

## 2025-06-19 DIAGNOSIS — T78.3XXA ANGIOEDEMA, INITIAL ENCOUNTER: Primary | ICD-10-CM

## 2025-06-19 DIAGNOSIS — R07.9 CHEST PAIN: ICD-10-CM

## 2025-06-19 DIAGNOSIS — M79.89 SWELLING OF RIGHT HAND: ICD-10-CM

## 2025-06-19 LAB
ALBUMIN SERPL-MCNC: 3.9 G/DL (ref 3.5–5)
ALBUMIN/GLOB SERPL: 1 RATIO (ref 1.1–2)
ALP SERPL-CCNC: 88 UNIT/L (ref 40–150)
ALT SERPL-CCNC: 20 UNIT/L (ref 0–55)
ANION GAP SERPL CALC-SCNC: 6 MEQ/L
AST SERPL-CCNC: 22 UNIT/L (ref 11–45)
BASOPHILS # BLD AUTO: 0.02 X10(3)/MCL
BASOPHILS NFR BLD AUTO: 0.3 %
BILIRUB SERPL-MCNC: 0.4 MG/DL
BUN SERPL-MCNC: 14 MG/DL (ref 8.9–20.6)
CALCIUM SERPL-MCNC: 9.3 MG/DL (ref 8.4–10.2)
CHLORIDE SERPL-SCNC: 105 MMOL/L (ref 98–107)
CO2 SERPL-SCNC: 26 MMOL/L (ref 22–29)
CREAT SERPL-MCNC: 0.84 MG/DL (ref 0.72–1.25)
CREAT/UREA NIT SERPL: 17
EOSINOPHIL # BLD AUTO: 0.06 X10(3)/MCL (ref 0–0.9)
EOSINOPHIL NFR BLD AUTO: 0.9 %
ERYTHROCYTE [DISTWIDTH] IN BLOOD BY AUTOMATED COUNT: 14.6 % (ref 11.5–17)
GFR SERPLBLD CREATININE-BSD FMLA CKD-EPI: >60 ML/MIN/1.73/M2
GLOBULIN SER-MCNC: 4.1 GM/DL (ref 2.4–3.5)
GLUCOSE SERPL-MCNC: 86 MG/DL (ref 74–100)
HCT VFR BLD AUTO: 44.7 % (ref 42–52)
HGB BLD-MCNC: 14.1 G/DL (ref 14–18)
IMM GRANULOCYTES # BLD AUTO: 0.02 X10(3)/MCL (ref 0–0.04)
IMM GRANULOCYTES NFR BLD AUTO: 0.3 %
LYMPHOCYTES # BLD AUTO: 0.51 X10(3)/MCL (ref 0.6–4.6)
LYMPHOCYTES NFR BLD AUTO: 8 %
MAGNESIUM SERPL-MCNC: 1.8 MG/DL (ref 1.6–2.6)
MCH RBC QN AUTO: 29.4 PG (ref 27–31)
MCHC RBC AUTO-ENTMCNC: 31.5 G/DL (ref 33–36)
MCV RBC AUTO: 93.1 FL (ref 80–94)
MONOCYTES # BLD AUTO: 0.5 X10(3)/MCL (ref 0.1–1.3)
MONOCYTES NFR BLD AUTO: 7.8 %
NEUTROPHILS # BLD AUTO: 5.28 X10(3)/MCL (ref 2.1–9.2)
NEUTROPHILS NFR BLD AUTO: 82.7 %
NRBC BLD AUTO-RTO: 0 %
PLATELET # BLD AUTO: 335 X10(3)/MCL (ref 130–400)
PMV BLD AUTO: 10.4 FL (ref 7.4–10.4)
POTASSIUM SERPL-SCNC: 3.9 MMOL/L (ref 3.5–5.1)
PROT SERPL-MCNC: 8 GM/DL (ref 6.4–8.3)
RBC # BLD AUTO: 4.8 X10(6)/MCL (ref 4.7–6.1)
SODIUM SERPL-SCNC: 137 MMOL/L (ref 136–145)
WBC # BLD AUTO: 6.39 X10(3)/MCL (ref 4.5–11.5)

## 2025-06-19 PROCEDURE — 25000003 PHARM REV CODE 250: Performed by: NURSE PRACTITIONER

## 2025-06-19 PROCEDURE — 63600175 PHARM REV CODE 636 W HCPCS: Performed by: INTERNAL MEDICINE

## 2025-06-19 PROCEDURE — 96374 THER/PROPH/DIAG INJ IV PUSH: CPT

## 2025-06-19 PROCEDURE — 25000003 PHARM REV CODE 250: Performed by: INTERNAL MEDICINE

## 2025-06-19 PROCEDURE — 96376 TX/PRO/DX INJ SAME DRUG ADON: CPT

## 2025-06-19 PROCEDURE — G0378 HOSPITAL OBSERVATION PER HR: HCPCS

## 2025-06-19 PROCEDURE — 63600175 PHARM REV CODE 636 W HCPCS: Performed by: EMERGENCY MEDICINE

## 2025-06-19 PROCEDURE — 85025 COMPLETE CBC W/AUTO DIFF WBC: CPT | Performed by: EMERGENCY MEDICINE

## 2025-06-19 PROCEDURE — 99203 OFFICE O/P NEW LOW 30 MIN: CPT | Mod: ,,, | Performed by: ORTHOPAEDIC SURGERY

## 2025-06-19 PROCEDURE — 99285 EMERGENCY DEPT VISIT HI MDM: CPT | Mod: 25

## 2025-06-19 PROCEDURE — 80053 COMPREHEN METABOLIC PANEL: CPT | Performed by: EMERGENCY MEDICINE

## 2025-06-19 PROCEDURE — 63600175 PHARM REV CODE 636 W HCPCS: Mod: JZ,TB | Performed by: EMERGENCY MEDICINE

## 2025-06-19 PROCEDURE — 63600175 PHARM REV CODE 636 W HCPCS

## 2025-06-19 PROCEDURE — 83735 ASSAY OF MAGNESIUM: CPT | Performed by: EMERGENCY MEDICINE

## 2025-06-19 PROCEDURE — 36410 VNPNXR 3YR/> PHY/QHP DX/THER: CPT

## 2025-06-19 PROCEDURE — 96375 TX/PRO/DX INJ NEW DRUG ADDON: CPT

## 2025-06-19 PROCEDURE — 63600175 PHARM REV CODE 636 W HCPCS: Performed by: NURSE PRACTITIONER

## 2025-06-19 PROCEDURE — C1751 CATH, INF, PER/CENT/MIDLINE: HCPCS

## 2025-06-19 RX ORDER — METHYLPREDNISOLONE SOD SUCC 125 MG
125 VIAL (EA) INJECTION
Status: COMPLETED | OUTPATIENT
Start: 2025-06-19 | End: 2025-06-19

## 2025-06-19 RX ORDER — METHYLPREDNISOLONE SOD SUCC 125 MG
VIAL (EA) INJECTION
Status: COMPLETED
Start: 2025-06-19 | End: 2025-06-19

## 2025-06-19 RX ORDER — ACETAMINOPHEN 325 MG/1
650 TABLET ORAL EVERY 4 HOURS PRN
Status: DISCONTINUED | OUTPATIENT
Start: 2025-06-19 | End: 2025-06-21 | Stop reason: HOSPADM

## 2025-06-19 RX ORDER — SODIUM CHLORIDE, SODIUM LACTATE, POTASSIUM CHLORIDE, CALCIUM CHLORIDE 600; 310; 30; 20 MG/100ML; MG/100ML; MG/100ML; MG/100ML
1000 INJECTION, SOLUTION INTRAVENOUS
Status: COMPLETED | OUTPATIENT
Start: 2025-06-19 | End: 2025-06-19

## 2025-06-19 RX ORDER — IBUPROFEN 200 MG
16 TABLET ORAL
Status: DISCONTINUED | OUTPATIENT
Start: 2025-06-19 | End: 2025-06-21 | Stop reason: HOSPADM

## 2025-06-19 RX ORDER — DIPHENHYDRAMINE HYDROCHLORIDE 50 MG/ML
25 INJECTION, SOLUTION INTRAMUSCULAR; INTRAVENOUS
Status: COMPLETED | OUTPATIENT
Start: 2025-06-19 | End: 2025-06-19

## 2025-06-19 RX ORDER — DEXAMETHASONE SODIUM PHOSPHATE 4 MG/ML
4 INJECTION, SOLUTION INTRA-ARTICULAR; INTRALESIONAL; INTRAMUSCULAR; INTRAVENOUS; SOFT TISSUE EVERY 12 HOURS
Status: DISCONTINUED | OUTPATIENT
Start: 2025-06-19 | End: 2025-06-19

## 2025-06-19 RX ORDER — SODIUM CHLORIDE 0.9 % (FLUSH) 0.9 %
10 SYRINGE (ML) INJECTION EVERY 12 HOURS PRN
Status: DISCONTINUED | OUTPATIENT
Start: 2025-06-19 | End: 2025-06-21 | Stop reason: HOSPADM

## 2025-06-19 RX ORDER — DIPHENHYDRAMINE HYDROCHLORIDE 50 MG/ML
50 INJECTION, SOLUTION INTRAMUSCULAR; INTRAVENOUS ONCE
Status: COMPLETED | OUTPATIENT
Start: 2025-06-19 | End: 2025-06-19

## 2025-06-19 RX ORDER — GLUCAGON 1 MG
1 KIT INJECTION
Status: DISCONTINUED | OUTPATIENT
Start: 2025-06-19 | End: 2025-06-21 | Stop reason: HOSPADM

## 2025-06-19 RX ORDER — FAMOTIDINE 10 MG/ML
20 INJECTION, SOLUTION INTRAVENOUS 2 TIMES DAILY
Status: DISCONTINUED | OUTPATIENT
Start: 2025-06-19 | End: 2025-06-21 | Stop reason: HOSPADM

## 2025-06-19 RX ORDER — HYDROCODONE BITARTRATE AND ACETAMINOPHEN 5; 325 MG/1; MG/1
1 TABLET ORAL EVERY 6 HOURS PRN
Refills: 0 | Status: DISCONTINUED | OUTPATIENT
Start: 2025-06-19 | End: 2025-06-21 | Stop reason: HOSPADM

## 2025-06-19 RX ORDER — IPRATROPIUM BROMIDE AND ALBUTEROL SULFATE 2.5; .5 MG/3ML; MG/3ML
3 SOLUTION RESPIRATORY (INHALATION) EVERY 4 HOURS PRN
Status: DISCONTINUED | OUTPATIENT
Start: 2025-06-19 | End: 2025-06-21 | Stop reason: HOSPADM

## 2025-06-19 RX ORDER — ACETAMINOPHEN 500 MG
1000 TABLET ORAL EVERY 6 HOURS PRN
Status: DISCONTINUED | OUTPATIENT
Start: 2025-06-19 | End: 2025-06-21 | Stop reason: HOSPADM

## 2025-06-19 RX ORDER — HYDROXYZINE PAMOATE 25 MG/1
25 CAPSULE ORAL ONCE
Status: COMPLETED | OUTPATIENT
Start: 2025-06-20 | End: 2025-06-20

## 2025-06-19 RX ORDER — FAMOTIDINE 10 MG/ML
40 INJECTION, SOLUTION INTRAVENOUS
Status: COMPLETED | OUTPATIENT
Start: 2025-06-19 | End: 2025-06-19

## 2025-06-19 RX ORDER — DIPHENHYDRAMINE HYDROCHLORIDE 50 MG/ML
INJECTION, SOLUTION INTRAMUSCULAR; INTRAVENOUS
Status: COMPLETED
Start: 2025-06-19 | End: 2025-06-19

## 2025-06-19 RX ORDER — IBUPROFEN 200 MG
24 TABLET ORAL
Status: DISCONTINUED | OUTPATIENT
Start: 2025-06-19 | End: 2025-06-21 | Stop reason: HOSPADM

## 2025-06-19 RX ORDER — SODIUM CHLORIDE 0.9 % (FLUSH) 0.9 %
10 SYRINGE (ML) INJECTION
Status: DISCONTINUED | OUTPATIENT
Start: 2025-06-19 | End: 2025-06-21 | Stop reason: HOSPADM

## 2025-06-19 RX ORDER — DEXAMETHASONE SODIUM PHOSPHATE 4 MG/ML
8 INJECTION, SOLUTION INTRA-ARTICULAR; INTRALESIONAL; INTRAMUSCULAR; INTRAVENOUS; SOFT TISSUE
Status: COMPLETED | OUTPATIENT
Start: 2025-06-19 | End: 2025-06-19

## 2025-06-19 RX ORDER — FAMOTIDINE 10 MG/ML
INJECTION, SOLUTION INTRAVENOUS
Status: COMPLETED
Start: 2025-06-19 | End: 2025-06-19

## 2025-06-19 RX ORDER — NALOXONE HCL 0.4 MG/ML
0.02 VIAL (ML) INJECTION
Status: DISCONTINUED | OUTPATIENT
Start: 2025-06-19 | End: 2025-06-21 | Stop reason: HOSPADM

## 2025-06-19 RX ORDER — PROCHLORPERAZINE EDISYLATE 5 MG/ML
5 INJECTION INTRAMUSCULAR; INTRAVENOUS EVERY 6 HOURS PRN
Status: DISCONTINUED | OUTPATIENT
Start: 2025-06-19 | End: 2025-06-21 | Stop reason: HOSPADM

## 2025-06-19 RX ORDER — ONDANSETRON HYDROCHLORIDE 2 MG/ML
4 INJECTION, SOLUTION INTRAVENOUS EVERY 4 HOURS PRN
Status: DISCONTINUED | OUTPATIENT
Start: 2025-06-19 | End: 2025-06-21 | Stop reason: HOSPADM

## 2025-06-19 RX ORDER — DIPHENHYDRAMINE HCL 25 MG
25 CAPSULE ORAL EVERY 6 HOURS PRN
Status: DISCONTINUED | OUTPATIENT
Start: 2025-06-19 | End: 2025-06-20

## 2025-06-19 RX ORDER — CETIRIZINE HYDROCHLORIDE 10 MG/1
10 TABLET ORAL DAILY
Status: DISCONTINUED | OUTPATIENT
Start: 2025-06-19 | End: 2025-06-21 | Stop reason: HOSPADM

## 2025-06-19 RX ORDER — DEXAMETHASONE SODIUM PHOSPHATE 4 MG/ML
4 INJECTION, SOLUTION INTRA-ARTICULAR; INTRALESIONAL; INTRAMUSCULAR; INTRAVENOUS; SOFT TISSUE EVERY 6 HOURS
Status: DISCONTINUED | OUTPATIENT
Start: 2025-06-19 | End: 2025-06-21 | Stop reason: HOSPADM

## 2025-06-19 RX ADMIN — DIPHENHYDRAMINE HYDROCHLORIDE 25 MG: 50 INJECTION INTRAMUSCULAR; INTRAVENOUS at 09:06

## 2025-06-19 RX ADMIN — FAMOTIDINE 40 MG: 10 INJECTION, SOLUTION INTRAVENOUS at 09:06

## 2025-06-19 RX ADMIN — DEXAMETHASONE SODIUM PHOSPHATE 4 MG: 4 INJECTION INTRA-ARTICULAR; INTRALESIONAL; INTRAMUSCULAR; INTRAVENOUS; SOFT TISSUE at 03:06

## 2025-06-19 RX ADMIN — CETIRIZINE HYDROCHLORIDE 10 MG: 10 TABLET, FILM COATED ORAL at 03:06

## 2025-06-19 RX ADMIN — SODIUM CHLORIDE, POTASSIUM CHLORIDE, SODIUM LACTATE AND CALCIUM CHLORIDE 1000 ML: 600; 310; 30; 20 INJECTION, SOLUTION INTRAVENOUS at 01:06

## 2025-06-19 RX ADMIN — DEXAMETHASONE SODIUM PHOSPHATE 8 MG: 4 INJECTION, SOLUTION INTRA-ARTICULAR; INTRALESIONAL; INTRAMUSCULAR; INTRAVENOUS; SOFT TISSUE at 11:06

## 2025-06-19 RX ADMIN — DIPHENHYDRAMINE HYDROCHLORIDE 50 MG: 50 INJECTION INTRAMUSCULAR; INTRAVENOUS at 05:06

## 2025-06-19 RX ADMIN — DIPHENHYDRAMINE HYDROCHLORIDE 25 MG: 25 CAPSULE ORAL at 03:06

## 2025-06-19 RX ADMIN — FAMOTIDINE 20 MG: 10 INJECTION, SOLUTION INTRAVENOUS at 09:06

## 2025-06-19 RX ADMIN — METHYLPREDNISOLONE SODIUM SUCCINATE 125 MG: 125 INJECTION, POWDER, FOR SOLUTION INTRAMUSCULAR; INTRAVENOUS at 09:06

## 2025-06-19 NOTE — NURSING
Pt rash to bilateral arms worsening. Swelling to lips also worsening. No airway compromise noted. Pictures in chart. Tee mackay notified and aware. Hospital med NP at the bedside. Report given upstairs. VSS, AAOx4.

## 2025-06-19 NOTE — ED PROVIDER NOTES
Encounter Date: 6/19/2025    SCRIBE #1 NOTE: I, Robert Fischer, am scribing for, and in the presence of,  Sheldon Mendiola III, MD. I have scribed the following portions of the note - Other sections scribed: HPI, ROS, and PE.       History     Chief Complaint   Patient presents with    Urticaria    Facial Swelling     Pt states facial/lip swelling and hives, uncertain on when it started woke up this morning with symptoms. Denies sob or difficulty swallowing. Currently on lisinopril x yrs, denies starting any new detergents or soap. Denies eating anything new. Took Claritin this morning     Bryon Paulson is a 49 y.o. male patient with a PMHx of crohn disease, DVT, and HTN who presents to the ED for evaluation of facial swelling/hives noticed upon waking up today. He states the swelling has not worsened and does not feel as if his throat is closing. He states that his legs are itchy and has hives to them. He has taken Lisinopril for the past few years. Patient states there are no new meds, detergents, or soaps. He does have crohn's disease but has not been on any steroids recently. Patient denies any shortness of breath.     The history is provided by the patient. No  was used.     Review of patient's allergies indicates:   Allergen Reactions    Ketorolac Swelling    Nalbuphine     Dicyclomine Rash     Other reaction(s): decrease in LOC    Tramadol-acetaminophen Rash     Past Medical History:   Diagnosis Date    Crohn disease     DVT (deep venous thrombosis)     Hypertension      Past Surgical History:   Procedure Laterality Date    BOWEL RESECTION      CHOLECYSTECTOMY       No family history on file.  Social History[1]  Review of Systems   Constitutional:  Negative for fatigue, fever and unexpected weight change.   HENT:  Positive for facial swelling. Negative for congestion, rhinorrhea and trouble swallowing.    Eyes:  Negative for pain.   Respiratory:  Negative for chest tightness,  shortness of breath and wheezing.    Cardiovascular:  Negative for chest pain.   Gastrointestinal:  Negative for abdominal pain, constipation, diarrhea, nausea and vomiting.   Genitourinary:  Negative for dysuria.   Musculoskeletal:  Negative for back pain and neck pain.   Skin:  Positive for rash.   Allergic/Immunologic: Negative for environmental allergies, food allergies and immunocompromised state.   Neurological:  Negative for dizziness and speech difficulty.   Hematological:  Does not bruise/bleed easily.   Psychiatric/Behavioral:  Negative for sleep disturbance and suicidal ideas.        Physical Exam     Initial Vitals [06/19/25 0831]   BP Pulse Resp Temp SpO2   131/86 104 16 98.6 °F (37 °C) 99 %      MAP       --         Physical Exam    Nursing note and vitals reviewed.  Constitutional: He appears well-developed and well-nourished. No distress.   HENT:   Head: Normocephalic and atraumatic.   Lip edema, no oral edema. Airway clear. Scattered hives to face.    Eyes: Conjunctivae and EOM are normal. Pupils are equal, round, and reactive to light. Right eye exhibits no discharge. Left eye exhibits no discharge. No scleral icterus.   Neck: No tracheal deviation present.   Normal range of motion.  Cardiovascular:  Normal rate, regular rhythm, normal heart sounds and intact distal pulses.           No murmur heard.  Pulmonary/Chest: Breath sounds normal. No stridor. No respiratory distress. He has no wheezes. He has no rales.   Abdominal: Abdomen is soft. Bowel sounds are normal. He exhibits no distension. There is no abdominal tenderness. There is no rebound and no guarding.   Musculoskeletal:         General: No tenderness or edema. Normal range of motion.      Cervical back: Normal range of motion.     Neurological: He is alert and oriented to person, place, and time. He has normal strength and normal reflexes. No cranial nerve deficit. GCS score is 15. GCS eye subscore is 4. GCS verbal subscore is 5. GCS  motor subscore is 6.   Skin: Skin is warm and dry. Capillary refill takes less than 2 seconds. No rash noted. No erythema. No pallor.   Psychiatric: He has a normal mood and affect. His behavior is normal. Judgment and thought content normal.         ED Course   Procedures  Labs Reviewed   CBC W/ AUTO DIFFERENTIAL    Narrative:     The following orders were created for panel order CBC Auto Differential.  Procedure                               Abnormality         Status                     ---------                               -----------         ------                     CBC with Differential[[id number]]                                                        Please view results for these tests on the individual orders.   COMPREHENSIVE METABOLIC PANEL   MAGNESIUM   CBC WITH DIFFERENTIAL          Imaging Results    None          Medications   lactated ringers infusion (has no administration in time range)   diphenhydrAMINE injection 25 mg ( Intravenous Override Pull 6/19/25 0915)   famotidine (PF) injection 40 mg (40 mg Intravenous Given 6/19/25 0905)   methylPREDNISolone sodium succinate injection 125 mg ( Intravenous Override Pull 6/19/25 0915)   diphenhydrAMINE injection 25 mg (25 mg Intravenous Given 6/19/25 0938)   dexAMETHasone injection 8 mg (8 mg Intravenous Given 6/19/25 1114)     Medical Decision Making  The differential diagnosis includes, but is not limited to, allergic reaction and hereditary angioedema.    Mild worsening lips willing still without intraoral involvement but after 4 hours will admit for observation    Amount and/or Complexity of Data Reviewed  Labs: ordered.    Risk  Prescription drug management.  Decision regarding hospitalization.            Scribe Attestation:   Scribe #1: I performed the above scribed service and the documentation accurately describes the services I performed. I attest to the accuracy of the note.    Attending Attestation:           Physician Attestation for  Scribe:  Physician Attestation Statement for Scribe #1: IMarlena Foster III, MD, reviewed documentation, as scribed by Robert Fischer in my presence, and it is both accurate and complete.             ED Course as of 06/19/25 1302   u Jun 19, 2025   [address] [place] [address]. [GG]      ED Course User Index  [GG] Robert Fischer                           Clinical Impression:  Final diagnoses:  [T78.3XXA] Angioedema, initial encounter (Primary)          ED Disposition Condition    Observation                       [1]   Social History  Tobacco Use    Smoking status: Never    Smokeless tobacco: Never   Substance Use Topics    Alcohol use: Not Currently    Drug use: Never        Sheldon Mendiola III, MD  06/19/25 130

## 2025-06-19 NOTE — PROCEDURES
"Bryon Paulson is a 49 y.o. male patient.    Temp: 97.8 °F (36.6 °C) (06/19/25 1528)  Pulse: 105 (06/19/25 1528)  Resp: 19 (06/19/25 1501)  BP: 133/87 (06/19/25 1528)  SpO2: (!) 91 % (06/19/25 1528)  Weight: 70.5 kg (155 lb 6.8 oz) (06/19/25 1528)  Height: 5' 7" (170.2 cm) (06/19/25 1528)    PICC  Date/Time: 6/19/2025 5:30 PM  Consent Done: Yes  Time out: Immediately prior to procedure a time out was called to verify the correct patient, procedure, equipment, support staff and site/side marked as required  Indications: vascular access  Anesthesia: local infiltration  Local anesthetic: lidocaine 1% without epinephrine  Anesthetic Total (mL): 3  Preparation: skin prepped with ChloraPrep  Skin prep agent dried: skin prep agent completely dried prior to procedure  Sterile barriers: all five maximum sterile barriers used - cap, mask, sterile gown, sterile gloves, and large sterile sheet  Hand hygiene: hand hygiene performed prior to central venous catheter insertion  Location details: right basilic  Catheter type: single lumen  Catheter size: 4 Fr  Catheter Length: 15cm    Ultrasound guidance: yes  Vessel Caliber: medium and patent, compressibility normal  Needle advanced into vessel with real time Ultrasound guidance.  Guidewire confirmed in vessel.  Image recorded and saved.  Sterile sheath used.  Number of attempts: 1  Post-procedure: blood return through all ports and sterile dressing applied    Complications: none          Name romi whitman RN  6/19/2025    "

## 2025-06-19 NOTE — H&P
[facility] Hospital Medicine History & Physical Examination       Patient Name: Bryon Paulson  MRN: [id number]  Patient Class: OP- Observation   Admission Date: 6/19/2025   Admitting Physician: APOLONIA Service   Length of Stay: 0  Attending Physician: Dr Shawanda Oliveira  Primary Care Provider: non-staff  Face-to-Face encounter date: 06/19/2025  Code Status:Full Code  Chief Complaint: Urticaria and Facial Swelling (Pt states facial/lip swelling and hives, uncertain on when it started woke up this morning with symptoms. Denies sob or difficulty swallowing. Currently on lisinopril x yrs, denies starting any new detergents or soap. Denies eating anything new. Took Claritin this morning)        Screening for Social Drivers for health:  Patient screened for food insecurity, housing instability, transportation needs, utility difficulties, and interpersonal safety (select all that apply as identified as concern)  []Housing or Food  []Transportation Needs  []Utility Difficulties  []Interpersonal safety  [x]None    Patient information was obtained from patient, patient's family, past medical records and/or ER records.     HISTORY OF PRESENT ILLNESS:   Bryon Paulson is a 49 y.o. male who  has a past medical history of Crohn disease, DVT (deep venous thrombosis), and Hypertension.. The patient presented to [facility] on 6/19/2025 with a primary complaint of lip swelling with facial swelling and hives to his arms chest and back.  Patient noticed the symptoms when he woke up.  No reported difficulty breathing.  His symptoms persisted which he started to develop swelling to bilateral hands and feet a pruritic rash to his arms which he presented to the ED for further evaluation.  Patient is on lisinopril for his blood pressure which he has been on for several years.  He denies any new foods or medications.  He denies any exposures.  Patient reports Crohn's disease however he is not on any biologic  therapy or medication for this as it has been controlled.  Lab work reviewed unremarkable.  Patient reports he was on Xarelto for DVT in the past however he is not taking the medication in several months and he finished his regimen.  Initial vital signs /86 pulse 104 respirations 16 temperature 98.6° F O2 saturation 99% on room air.  Patient received several doses of steroid therapy, H2 blocker, and Benadryl in the ED which provided some relief.  Patient is still having significant swelling which has progressed while in the ED of lips in bilateral hands.  Patient is maintaining his oral secretions and demonstrates no signs of airway compromise at the time of examination.  Patient is admitted to hospital medicine services for further management.    PAST MEDICAL HISTORY:     Past Medical History:   Diagnosis Date    Crohn disease     DVT (deep venous thrombosis)     Hypertension        PAST SURGICAL HISTORY:     Past Surgical History:   Procedure Laterality Date    BOWEL RESECTION      CHOLECYSTECTOMY         ALLERGIES:   Ketorolac, Nalbuphine, Dicyclomine, and Tramadol-acetaminophen    FAMILY HISTORY:   Reviewed and negative    SOCIAL HISTORY:     Social History     Tobacco Use    Smoking status: Never    Smokeless tobacco: Never   Substance Use Topics    Alcohol use: Not Currently        HOME MEDICATIONS:   As documented  Prior to Admission medications    Medication Sig Start Date End Date Taking? Authorizing Provider   HYDROcodone-acetaminophen (NORCO)  mg per tablet Take 1 tablet by mouth every 6 (six) hours as needed for Pain. 12/15/24   Izaiah Khalil MD   HYDROcodone-acetaminophen (NORCO)  mg per tablet Take 1 tablet by mouth every 6 (six) hours as needed for Pain. 5/17/25   Tiesha Barlow FNP   ondansetron (ZOFRAN) 4 MG tablet Take 1 tablet (4 mg total) by mouth every 8 (eight) hours as needed for Nausea. 5/17/25   Tiesha Barlow FNP   ondansetron (ZOFRAN-ODT) 4 MG TbDL Take 1  tablet (4 mg total) by mouth every 6 (six) hours as needed (Nausea). 4/25/24   Ruben Harris, NP   ondansetron (ZOFRAN-ODT) 4 MG TbDL Take 1 tablet (4 mg total) by mouth every 6 (six) hours as needed (Nausea). 12/15/24   Izaiah Khalil MD   XARELTO DVT-PE TREAT 30D START 15 mg (42)- 20 mg (9) tablet dose pack Take 1 tablet (15 mg) by mouth twice daily with food for 21 days followed by 1 tablet (20 mg) by mouth once daily with food 7/30/23   Sheldon Mendiola III, MD   lisinopriL 10 MG tablet Take 10 mg by mouth once daily.  6/19/25  Provider, Historical       REVIEW OF SYSTEMS:   Except as documented, all other systems reviewed and negative     PHYSICAL EXAM:     VITAL SIGNS: 24 HRS MIN & MAX LAST   Temp  Min: 98.6 °F (37 °C)  Max: 98.6 °F (37 °C) 98.6 °F (37 °C)   BP  Min: 113/81  Max: 134/92 (!) 134/92   Pulse  Min: 96  Max: 104  97   Resp  Min: 16  Max: 20 16   SpO2  Min: 96 %  Max: 100 % 96 %       General appearance: Well-developed, well-nourished male in no apparent distress.  No family at the bedside  HENT: Atraumatic head. Moist mucous membranes of oral cavity.  Significant lip swelling  Eyes: Normal extraocular movements.  PERRL  Neck: Supple.   Lungs: Clear to auscultation bilaterally. No wheezing present.   Heart: Regular rate and rhythm. S1 and S2 present capillary refill brisk, bilateral hand swelling  Abdomen: Soft, non-distended, non-tender. No rebound tenderness/guarding. Bowel sounds are normal.   Extremities: No cyanosis, clubbing, bilateral hand and arm swelling with scattered erythematous rash  Skin:  Warm and dry erythematous rash to bilateral hands and arms chest and back with discoloration to bilateral hands   See media                                   Neuro: Motor and sensory exams grossly intact. Good tone. Muscle strength 5/5 in all 4 extremities  Psych/mental status:  Flat affect, cooperative    LABS AND IMAGING:     Recent Labs   Lab 06/19/25  1314   WBC 6.39   RBC 4.80   HGB  14.1   HCT 44.7   MCV 93.1   MCH 29.4   MCHC 31.5*   RDW 14.6      MPV 10.4       Recent Labs   Lab 06/19/25  1314      K 3.9      CO2 26   BUN 14.0   CREATININE 0.84   GLU 86   CALCIUM 9.3   MG 1.80   ALBUMIN 3.9   PROT 8.0   ALKPHOS 88   ALT 20   AST 22   BILITOT 0.4       Microbiology Results (last 7 days)       ** No results found for the last 168 hours. **             CT Abdomen Pelvis With IV Contrast NO Oral Contrast  Narrative: EXAMINATION:  CT ABDOMEN PELVIS WITH IV CONTRAST    CLINICAL HISTORY:  Abdominal pain, acute, nonlocalized;  lower abdominal pain x1.5 weeks.  Crohn's    TECHNIQUE:  Helically acquired images with axial, sagittal and coronal reformations were obtained from the lung bases to the pubic symphysis after the IV administration of contrast.    Automated tube current modulation, weight-based exposure dosing, and/or iterative reconstruction technique utilized to reach lowest reasonably achievable exposure rate.    DLP: 366 mGy*cm    COMPARISON:  CT abdomen pelvis 12/15/2024    FINDINGS:  HEART: Normal in size. No pericardial effusion.    LUNG BASES: Well aerated.    LIVER: Unchanged small enhancing lesion the right lobe of the liver, possibly flash fill hemangioma.    BILIARY: Gallbladder is surgically absent.    PANCREAS: No inflammatory change.    SPLEEN: Normal in size    ADRENALS: No mass.    KIDNEYS/URETERS: Punctate 2-3 mm nonobstructing right renal caliceal calculi.  Incidental renal cysts.  No imaging follow-up necessary.  No hydronephrosis.    GI TRACT/MESENTERY: Small and large bowel loops are fluid-filled without evidence of obstruction.  There is mild colonic wall thickening and pericolonic fat stranding.    PERITONEUM: No free fluid.No free air.    LYMPH NODES: No enlarged lymph nodes by size criteria.    VASCULATURE: No significant atherosclerosis or aneurysm.    BLADDER: Normal appearance given degree of distention.    REPRODUCTIVE ORGANS: Normal as  visualized.    SOFT TISSUES: Unremarkable.    BONES: No acute osseous abnormality.  Impression: Findings compatible with nonspecific colitis    Electronically signed by: Frances Suggs  Date:    05/17/2025  Time:    15:23        ASSESSMENT & PLAN:   ASSESSMENT:  Acute angioedema with facial and lip swelling-POA   Bilateral hand swelling-POA   Pruritic erythematous rash-POA   Allergic reaction-suspect secondary to ACE inhibitor therapy-POA   HTN-essential-POA   History of Crohn's disease-no acute exacerbation at this time-POA     PLAN:  IV hydration   Dexamethasone 4 mg q.12 hours   Famotidine 20 mg IV b.i.d.   Neurovascular checks her bilateral hands   Monitor pulse oximetry   Monitor swallowing function and alert provider for any signs of dysphagia   Hold lisinopril   PRN IV antihypertensive medications if needed  Repeat lab work in a.m.    VTE Prophylaxis: SCD for DVT prophylaxis and will be advised to be as mobile as possible and sit in a chair as tolerated    Patient condition:  Stable    __________________________________________________________________________  INPATIENT LIST OF MEDICATIONS     Scheduled Meds:   dexAMETHasone (Decadron) IV (PEDS and ADULTS)  4 mg Intravenous Q12H    famotidine (PF)  20 mg Intravenous BID    lactated ringers  1,000 mL Intravenous ED 1 Time     Continuous Infusions:  PRN Meds:.  Current Facility-Administered Medications:     acetaminophen, 1,000 mg, Oral, Q6H PRN    acetaminophen, 650 mg, Oral, Q4H PRN    dextrose 50%, 12.5 g, Intravenous, PRN    dextrose 50%, 25 g, Intravenous, PRN    diphenhydrAMINE, 25 mg, Oral, Q6H PRN    glucagon (human recombinant), 1 mg, Intramuscular, PRN    glucose, 16 g, Oral, PRN    glucose, 24 g, Oral, PRN    naloxone, 0.02 mg, Intravenous, PRN    ondansetron, 4 mg, Intravenous, Q4H PRN    prochlorperazine, 5 mg, Intravenous, Q6H PRN    sodium chloride 0.9%, 10 mL, Intravenous, PRN      I, DEWAYNE Segura have reviewed and discussed the  case with   Dr. Shawanda Oliveira.  Please see the following addendum for further assessment and plan from their attending MD.  Apolinar STOUT Melanie, [facility]   06/19/2025    ________________________________________________________________________________    MD Addendum:  I, Dr. Tee leroy ---assumed care of this patient today   For the patient encounter, I performed the substantive portion of the visit, I reviewed the NP/PA documentation, treatment plan, and medical decision making.  I had face to face time with this patient     A. History:  49 y.o. male who  has a past medical history of Crohn disease, DVT (deep venous thrombosis), and Hypertension.. The patient presented to [facility] on 6/19/2025 with a primary complaint of lip swelling with facial swelling and hives to his arms chest and back.  Patient noticed the symptoms when he woke up.  No reported difficulty breathing.  His symptoms persisted which he started to develop swelling to bilateral hands and feet a pruritic rash to his arms which he presented to the ED for further evaluation.  Patient is on lisinopril for his blood pressure which he has been on for several years.  He denies any new foods or medications.  He denies any exposures.  Patient reports Crohn's disease however he is not on any biologic therapy or medication for this as it has been controlled.  Lab work reviewed unremarkable.  Patient reports he was on Xarelto for DVT in the past however he is not taking the medication in several months and he finished his regimen.  Initial vital signs /86 pulse 104 respirations 16 temperature 98.6° F O2 saturation 99% on room air.  Patient received several doses of steroid therapy, H2 blocker, and Benadryl in the ED which provided some relief.  Patient is still having significant swelling which has progressed while in the ED of lips in bilateral hands.  Patient is maintaining his oral secretions and demonstrates no signs of airway compromise at the time of  examination.  Patient is admitted to hospital medicine services for further management.      ASSESSMENT & PLAN:   ASSESSMENT:  Acute angioedema with facial and lip swelling-POA   Bilateral hand swelling-POA   Pruritic erythematous rash-POA   Allergic reaction-suspect secondary to ACE inhibitor therapy-POA   HTN-essential-POA   History of Crohn's disease-no acute exacerbation at this time-POA     PLAN:  IV hydration   Dexamethasone 4 mg q.6 hours   Famotidine 20 mg IV b.i.d.   Add cetirizine 10mg daily  Neurovascular checks her bilateral hands   Monitor pulse oximetry   Monitor swallowing function and alert provider for any signs of dysphagia   Hold lisinopril   PRN IV antihypertensive medications if needed  Spoke to hand surgeon to r/o compartment syndrome of the left hand- was ruled out.  Repeat lab work in a.m.    VTE Prophylaxis: SCD for DVT prophylaxis and will be advised to be as mobile as possible and sit in a chair as tolerated    Patient condition:  Stable    Discharge Planning and Disposition: No mobility needs. Ambulating well. Good social support system.   Anticipated discharge    All diagnosis and differential diagnosis have been reviewed; assessment and plan has been documented; I have personally reviewed the labs and test results that are presently available; I have reviewed the patients medication list; I have reviewed the consulting providers response and recommendations. I have reviewed or attempted to review medical records based upon their availability.    All of the patient and family questions have been addressed and answered. Patient's is agreeable to the above stated plan. I will continue to monitor closely and make adjustments to medical management as needed.

## 2025-06-20 LAB
ALBUMIN SERPL-MCNC: 3.4 G/DL (ref 3.5–5)
ALBUMIN/GLOB SERPL: 0.9 RATIO (ref 1.1–2)
ALP SERPL-CCNC: 81 UNIT/L (ref 40–150)
ALT SERPL-CCNC: 17 UNIT/L (ref 0–55)
ANION GAP SERPL CALC-SCNC: 11 MEQ/L
AST SERPL-CCNC: 14 UNIT/L (ref 11–45)
BASOPHILS # BLD AUTO: 0.01 X10(3)/MCL
BASOPHILS NFR BLD AUTO: 0.1 %
BILIRUB SERPL-MCNC: 0.4 MG/DL
BUN SERPL-MCNC: 19.4 MG/DL (ref 8.9–20.6)
CALCIUM SERPL-MCNC: 8.9 MG/DL (ref 8.4–10.2)
CHLORIDE SERPL-SCNC: 102 MMOL/L (ref 98–107)
CO2 SERPL-SCNC: 23 MMOL/L (ref 22–29)
CREAT SERPL-MCNC: 0.84 MG/DL (ref 0.72–1.25)
CREAT/UREA NIT SERPL: 23
CRP SERPL-MCNC: 70.7 MG/L
EOSINOPHIL # BLD AUTO: 0 X10(3)/MCL (ref 0–0.9)
EOSINOPHIL NFR BLD AUTO: 0 %
ERYTHROCYTE [DISTWIDTH] IN BLOOD BY AUTOMATED COUNT: 14.4 % (ref 11.5–17)
ERYTHROCYTE [SEDIMENTATION RATE] IN BLOOD: 17 MM/HR (ref 0–15)
GFR SERPLBLD CREATININE-BSD FMLA CKD-EPI: >60 ML/MIN/1.73/M2
GLOBULIN SER-MCNC: 4 GM/DL (ref 2.4–3.5)
GLUCOSE SERPL-MCNC: 141 MG/DL (ref 74–100)
HCT VFR BLD AUTO: 44 % (ref 42–52)
HGB BLD-MCNC: 15.3 G/DL (ref 14–18)
IMM GRANULOCYTES # BLD AUTO: 0.03 X10(3)/MCL (ref 0–0.04)
IMM GRANULOCYTES NFR BLD AUTO: 0.3 %
LYMPHOCYTES # BLD AUTO: 0.62 X10(3)/MCL (ref 0.6–4.6)
LYMPHOCYTES NFR BLD AUTO: 6.4 %
MCH RBC QN AUTO: 29.1 PG (ref 27–31)
MCHC RBC AUTO-ENTMCNC: 34.8 G/DL (ref 33–36)
MCV RBC AUTO: 83.8 FL (ref 80–94)
MONOCYTES # BLD AUTO: 0.73 X10(3)/MCL (ref 0.1–1.3)
MONOCYTES NFR BLD AUTO: 7.6 %
NEUTROPHILS # BLD AUTO: 8.23 X10(3)/MCL (ref 2.1–9.2)
NEUTROPHILS NFR BLD AUTO: 85.6 %
NRBC BLD AUTO-RTO: 0 %
PLATELET # BLD AUTO: 304 X10(3)/MCL (ref 130–400)
PLATELETS.RETICULATED NFR BLD AUTO: 2.3 % (ref 0.9–11.2)
PMV BLD AUTO: 10.2 FL (ref 7.4–10.4)
POTASSIUM SERPL-SCNC: 4 MMOL/L (ref 3.5–5.1)
PROT SERPL-MCNC: 7.4 GM/DL (ref 6.4–8.3)
RBC # BLD AUTO: 5.25 X10(6)/MCL (ref 4.7–6.1)
SODIUM SERPL-SCNC: 136 MMOL/L (ref 136–145)
WBC # BLD AUTO: 9.62 X10(3)/MCL (ref 4.5–11.5)

## 2025-06-20 PROCEDURE — 63600175 PHARM REV CODE 636 W HCPCS: Performed by: NURSE PRACTITIONER

## 2025-06-20 PROCEDURE — 25000003 PHARM REV CODE 250: Performed by: INTERNAL MEDICINE

## 2025-06-20 PROCEDURE — 80053 COMPREHEN METABOLIC PANEL: CPT | Performed by: NURSE PRACTITIONER

## 2025-06-20 PROCEDURE — 85025 COMPLETE CBC W/AUTO DIFF WBC: CPT | Performed by: NURSE PRACTITIONER

## 2025-06-20 PROCEDURE — 63600175 PHARM REV CODE 636 W HCPCS

## 2025-06-20 PROCEDURE — 36415 COLL VENOUS BLD VENIPUNCTURE: CPT | Performed by: NURSE PRACTITIONER

## 2025-06-20 PROCEDURE — 63600175 PHARM REV CODE 636 W HCPCS: Performed by: INTERNAL MEDICINE

## 2025-06-20 PROCEDURE — 96376 TX/PRO/DX INJ SAME DRUG ADON: CPT

## 2025-06-20 PROCEDURE — 25000003 PHARM REV CODE 250

## 2025-06-20 PROCEDURE — G0378 HOSPITAL OBSERVATION PER HR: HCPCS

## 2025-06-20 PROCEDURE — 85652 RBC SED RATE AUTOMATED: CPT | Performed by: INTERNAL MEDICINE

## 2025-06-20 PROCEDURE — 86140 C-REACTIVE PROTEIN: CPT | Performed by: INTERNAL MEDICINE

## 2025-06-20 PROCEDURE — 86235 NUCLEAR ANTIGEN ANTIBODY: CPT | Performed by: INTERNAL MEDICINE

## 2025-06-20 RX ORDER — DIPHENHYDRAMINE HYDROCHLORIDE 50 MG/ML
25 INJECTION, SOLUTION INTRAMUSCULAR; INTRAVENOUS ONCE
Status: COMPLETED | OUTPATIENT
Start: 2025-06-20 | End: 2025-06-20

## 2025-06-20 RX ORDER — DIPHENHYDRAMINE HCL 25 MG
25 CAPSULE ORAL EVERY 6 HOURS PRN
Status: DISCONTINUED | OUTPATIENT
Start: 2025-06-21 | End: 2025-06-21 | Stop reason: HOSPADM

## 2025-06-20 RX ORDER — DIPHENHYDRAMINE HCL 25 MG
25 CAPSULE ORAL EVERY 6 HOURS PRN
Status: DISCONTINUED | OUTPATIENT
Start: 2025-06-20 | End: 2025-06-20

## 2025-06-20 RX ORDER — DIPHENHYDRAMINE HCL 25 MG
25 CAPSULE ORAL EVERY 6 HOURS
Status: DISCONTINUED | OUTPATIENT
Start: 2025-06-20 | End: 2025-06-20

## 2025-06-20 RX ADMIN — CETIRIZINE HYDROCHLORIDE 10 MG: 10 TABLET, FILM COATED ORAL at 09:06

## 2025-06-20 RX ADMIN — DEXAMETHASONE SODIUM PHOSPHATE 4 MG: 4 INJECTION INTRA-ARTICULAR; INTRALESIONAL; INTRAMUSCULAR; INTRAVENOUS; SOFT TISSUE at 10:06

## 2025-06-20 RX ADMIN — HYDROXYZINE PAMOATE 25 MG: 25 CAPSULE ORAL at 12:06

## 2025-06-20 RX ADMIN — HYDROCODONE BITARTRATE AND ACETAMINOPHEN 1 TABLET: 5; 325 TABLET ORAL at 12:06

## 2025-06-20 RX ADMIN — HYDROCODONE BITARTRATE AND ACETAMINOPHEN 1 TABLET: 5; 325 TABLET ORAL at 04:06

## 2025-06-20 RX ADMIN — DIPHENHYDRAMINE HYDROCHLORIDE 25 MG: 25 CAPSULE ORAL at 06:06

## 2025-06-20 RX ADMIN — DEXAMETHASONE SODIUM PHOSPHATE 4 MG: 4 INJECTION INTRA-ARTICULAR; INTRALESIONAL; INTRAMUSCULAR; INTRAVENOUS; SOFT TISSUE at 12:06

## 2025-06-20 RX ADMIN — DEXAMETHASONE SODIUM PHOSPHATE 4 MG: 4 INJECTION INTRA-ARTICULAR; INTRALESIONAL; INTRAMUSCULAR; INTRAVENOUS; SOFT TISSUE at 05:06

## 2025-06-20 RX ADMIN — FAMOTIDINE 20 MG: 10 INJECTION, SOLUTION INTRAVENOUS at 09:06

## 2025-06-20 RX ADMIN — DIPHENHYDRAMINE HYDROCHLORIDE 25 MG: 50 INJECTION INTRAMUSCULAR; INTRAVENOUS at 10:06

## 2025-06-20 NOTE — PROGRESS NOTES
Patient is a 49-year-old gentleman with the angioedema.  We are consulted for hand compartment syndrome.  He was evaluated last night by my partner Dr. Reddy.  And again this morning by myself no sign of compartment syndrome patient willingly move all fingers without pain does have some swelling to the hand no blistering no skin compromise compartments are very soft and compressible.  Patient can follow up with his primary care physician.    Adam   none

## 2025-06-20 NOTE — PLAN OF CARE
Spoke with nursing and then with pt. No needs anticipate discharge in 1-2 days. Issues were associated with medication reaction.  Pt tells me he has a PCP , he just switched and doesn't have the dr name with him  [facility]   Independent  No needs

## 2025-06-20 NOTE — CONSULTS
[facility]                       [address] [place] [address]    PATIENT NAME:       MOLLY PAULSON  YOB: 1975  CSN:                [id number]   MRN:                [id number]  ADMIT DATE:         06/19/2025 08:36:00  PHYSICIAN:          Darron Reddy MD                            CONSULTATION    DATE OF CONSULT:  06/19/2025 05:57:27    CONSULTATION DIAGNOSIS:  Left hand swelling with concern for possible   compartment syndrome.    CHIEF COMPLAINT:  Hand and face swelling.    HISTORY OF PRESENT ILLNESS:  Mr. Paulson is a 49-year-old male who was admitted   to the hospital medicine service with urticaria and facial swelling.  He also   has hives that broke out over his extremities including his left hand.  He woke   up this morning with symptoms.  He is unsure what caused the swelling.  He is   currently on lisinopril and it has been years.  He denies any new detergents.    Did not take or eat anything.  Due to the amount of swelling in his hand,   Orthopedics was consulted for evaluation for a possible compartment syndrome.    My partner, Dr. Ranjith Medina, is on-call and he is at home.  I was told by the   hospital and I made myself available to go and evaluate the patient.  We were at   the bedside within 30 minutes.  At the time of my evaluation, the patient was   receiving a procedure.  He was getting a midline placed in his right upper   extremity.  Upon entering the room, the patient is resting comfortably.  The TV   is on and he is sitting up in bed.  He does have swelling in the face and eyes,   but he is able to see and he has no labored breathing.  He is not in significant   pain.  He states that he has some soreness when he attempts to remove because   of the swelling, but no intense pain in his extremities.    REVIEW OF SYSTEMS:  All reported negative aside from HPI  Constitutional: Negative  for chills and fever.   HENT: Negative for congestion and hearing loss.    Eyes: Negative for visual disturbance.   Cardiovascular: Negative for chest pain and syncope.   Respiratory: Negative for cough and shortness of breath.    Hematologic/Lymphatic: Does not bruise/bleed easily.   Skin: Urticaria and swelling with raised plaques.   Gastrointestinal: Negative for abdominal pain, nausea and vomiting.   Genitourinary: Negative for dysuria and hematuria.   Neurological: Negative for numbness, sensory change and weakness.   Psychiatric/Behavioral: Negative for altered mental status.       PAST MEDICAL HISTORY:  Significant for Crohn disease, DVT, and hypertension.    PAST SURGICAL HISTORY:  He has had bowel resection and cholecystectomy.    ALLERGIES:  HE HAS ALLERGIES TO TORADOL, NALBUPHINE, DICYCLOMINE, AND TRAMADOL.     SOCIAL HISTORY:  He denies alcohol, tobacco, or drug use.    PHYSICAL EXAMINATION:  GENERAL:  He is in no apparent distress.  He is awake and alert.  HEAD, EYES, EARS, NOSE, THROAT:  He has some periorbital edema present, but he   is able to perform full active range of motion of his extraocular muscles.  He   has no blurry vision.  He has no labored breathing.  He is satting well on room   air.  CARDIOVASCULAR:  Normal rate with a regular rhythm.  He has good peripheral   perfusion.  GI:  His abdomen is soft, nontender, and nondistended.  INTEGUMENTARY SYSTEM:  His skin has urticaria with multiple areas of hives and   redness and raised rashes on his extremities as well as around his face.  MUSCULOSKELETAL EVALUATION:  His right upper extremity is currently being   utilized for application of a midline to receive IV medicines.  Unable to   examine his right upper extremity.  His left upper extremity is out of the   drape.  He does have significant hand edema.  It is localized probably to the   mid forearm down to the digits.  It is boggy, but soft.  He has a palpable   radial pulse.  He is able to  perform active range of motion of the digits, so it   is limited due to the amount of swelling.  He has no pain in the forearm with   passive range of motion of the digits.  He has no pain with supination,   pronation, or active range of motion of the wrist.  Distally, he has edema in   his feet.  Again, no lower extremity compartment swelling.  Palpable DP pulses.    Full range of motion of the ankle and digits distally.  He does have some   concerning swelling and symptoms.  However, he has no signs or symptoms of any   compartment syndrome.    ASSESSMENT AND PLAN:  No evidence of any compartment syndrome in the left upper   extremity, though he will need close neurovascular checks.  It is currently well   controlled right now.  He has not had any treatment other than oral Benadryl   and will soon have IV access for IV medications.  I discussed my findings with   his nurse and discussed what symptoms to monitor him for overnight.  No plans   for any operative intervention from an orthopedic standpoint.  At this time, he   keep the limb elevated and iced, continue to work on range of motion in order to   improve his swelling.  They can wrap his hand with an Ace bandage starting at   the hand with a thumb hole cut and wrap up the forearm loosely.  All questions   and concerns were addressed and they understand and agree.  We will sign off for   now.  Please call with any change in condition.    ______________________________  MD JACQUELINE Louis/CARMELA  DD:  06/19/2025  Time:  07:21PM  DT:  06/19/2025  Time:  07:44PM  Job #:  [id number]      CONSULTATION

## 2025-06-20 NOTE — PROGRESS NOTES
Hospital Medicine  Progress Note    Patient Name: Bryon Paulson  MRN: [id number]  Status: OP- Observation   Admission Date: 6/19/2025  Length of Stay: 0  Date of Service: 06/20/2025       CC: hospital follow-up for facial swelling       SUBJECTIVE   49 y.o. male patient with a crohn disease, DVT, and HTN, presented to the ED for evaluation of facial swelling/hives.  Patient noticed swelling upon waking, and has persisted but necessarily worsened. He also noted swelling and rash to bilateral hands and feet, which are also pruritic. Denies difficulty breathing or swallowing.  He has taken Lisinopril for the past few years.  Denies any new meds, detergents, or soaps, or any exposure chemicals.  He does have crohn's disease but has not been on any steroids recently.     Today: Patient seen and examined at bedside, and chart reviewed.  Facial swelling improved, but still with hand and feet swelling, along with rash.      MEDICATIONS   Scheduled   cetirizine  10 mg Oral Daily    dexAMETHasone (Decadron) IV (PEDS and ADULTS)  4 mg Intravenous Q6H    famotidine (PF)  20 mg Intravenous BID     Continuous Infusions        PHYSICAL EXAM   VITALS: T 98.6 °F (37 °C)   /77   P (!) 112   RR 18   O2 99 %    GENERAL: Awake and in NAD  LUNGS: CTA anteriorly no wheezing or stridor  CVS: Normal rate  GI/: Soft, NT, bowel sounds positive.  EXTREMITIES: Radial pulses 2+  NEURO: AAOx3  PSYCH: Cooperative      LABS   CBC  Recent Labs     06/19/25  1314 06/20/25  0551   WBC 6.39 9.62   RBC 4.80 5.25   HGB 14.1 15.3   HCT 44.7 44.0   MCV 93.1 83.8   MCH 29.4 29.1   MCHC 31.5* 34.8   RDW 14.6 14.4    304     CHEM  Recent Labs     06/19/25  1314 06/20/25  0551    136   K 3.9 4.0   CO2 26 23   BUN 14.0 19.4   CREATININE 0.84 0.84   CALCIUM 9.3 8.9   MG 1.80  --    ALBUMIN 3.9 3.4*   GLOBULIN 4.1* 4.0*   ALKPHOS 88 81   ALT 20 17   AST 22 14   BILITOT 0.4 0.4   CRP  --  70.70*         DIAGNOSTICS   CT Hand Without  Contrast Left  FINDINGS  Images were reviewed in bone and soft tissue windows.  Exam quality: adequate for evaluation  Alignment: Normal alignment of the hand bony structures.  Bony structures: No fractures.  Degenerative changes: No significant degenerative changes seen in the visualized bony structures of the wrist.  Miscellaneous: There is generalized swelling / subcutaneous edema of the right hand soft tissues, which is suggestive of cellulitis. No definitive organized fluid collection is identified to suggest focal abscess. No evidence of periosteal reaction is seen in the underlying osseous structures that would denote osteomyelitis.  IMPRESSION  Soft tissue findings suggestive of cellulitis.  No evidence of drainable abscess or acute/destructive osseous process.  Electronically signed by: Bakari Patino  Date:    06/20/2025  Time:    07:06      ASSESSMENT   Suspected allergic reaction, ? angioedema  Essential HTN on lisinopril  DVT on Xarelto  h/o Crohns    PLAN   Continue with steroids H1/H2 blockers  Etiology still unclear, though would avoid lisinopril in the future  Will continue to monitor overnight again given ongoing swelling  Will reassess tomorrow a.m.  Otherwise continue current management and monitoring in the interim      Prophylaxis:  Home Xarelto        Christoph Pires MD  [facility] Medicine

## 2025-06-21 VITALS
DIASTOLIC BLOOD PRESSURE: 93 MMHG | BODY MASS INDEX: 24.4 KG/M2 | SYSTOLIC BLOOD PRESSURE: 139 MMHG | RESPIRATION RATE: 16 BRPM | TEMPERATURE: 98 F | HEIGHT: 67 IN | HEART RATE: 86 BPM | WEIGHT: 155.44 LBS | OXYGEN SATURATION: 98 %

## 2025-06-21 PROBLEM — T78.3XXA ANGIO-EDEMA: Status: RESOLVED | Noted: 2025-06-21 | Resolved: 2025-06-21

## 2025-06-21 PROBLEM — T78.3XXA ANGIO-EDEMA: Status: ACTIVE | Noted: 2025-06-21

## 2025-06-21 LAB
ANION GAP SERPL CALC-SCNC: 10 MEQ/L
BASOPHILS # BLD AUTO: 0.02 X10(3)/MCL
BASOPHILS NFR BLD AUTO: 0.1 %
BUN SERPL-MCNC: 16.3 MG/DL (ref 8.9–20.6)
CALCIUM SERPL-MCNC: 8.3 MG/DL (ref 8.4–10.2)
CHLORIDE SERPL-SCNC: 102 MMOL/L (ref 98–107)
CO2 SERPL-SCNC: 24 MMOL/L (ref 22–29)
CREAT SERPL-MCNC: 0.75 MG/DL (ref 0.72–1.25)
CREAT/UREA NIT SERPL: 22
EOSINOPHIL # BLD AUTO: 0 X10(3)/MCL (ref 0–0.9)
EOSINOPHIL NFR BLD AUTO: 0 %
ERYTHROCYTE [DISTWIDTH] IN BLOOD BY AUTOMATED COUNT: 14.5 % (ref 11.5–17)
GFR SERPLBLD CREATININE-BSD FMLA CKD-EPI: >60 ML/MIN/1.73/M2
GLUCOSE SERPL-MCNC: 147 MG/DL (ref 74–100)
HCT VFR BLD AUTO: 39.9 % (ref 42–52)
HGB BLD-MCNC: 13.2 G/DL (ref 14–18)
IMM GRANULOCYTES # BLD AUTO: 0.08 X10(3)/MCL (ref 0–0.04)
IMM GRANULOCYTES NFR BLD AUTO: 0.5 %
LYMPHOCYTES # BLD AUTO: 0.9 X10(3)/MCL (ref 0.6–4.6)
LYMPHOCYTES NFR BLD AUTO: 6.1 %
MCH RBC QN AUTO: 28.8 PG (ref 27–31)
MCHC RBC AUTO-ENTMCNC: 33.1 G/DL (ref 33–36)
MCV RBC AUTO: 87.1 FL (ref 80–94)
MONOCYTES # BLD AUTO: 0.81 X10(3)/MCL (ref 0.1–1.3)
MONOCYTES NFR BLD AUTO: 5.5 %
NEUTROPHILS # BLD AUTO: 12.92 X10(3)/MCL (ref 2.1–9.2)
NEUTROPHILS NFR BLD AUTO: 87.8 %
NRBC BLD AUTO-RTO: 0 %
PLATELET # BLD AUTO: 319 X10(3)/MCL (ref 130–400)
PMV BLD AUTO: 9.7 FL (ref 7.4–10.4)
POTASSIUM SERPL-SCNC: 3.9 MMOL/L (ref 3.5–5.1)
RBC # BLD AUTO: 4.58 X10(6)/MCL (ref 4.7–6.1)
SODIUM SERPL-SCNC: 136 MMOL/L (ref 136–145)
WBC # BLD AUTO: 14.73 X10(3)/MCL (ref 4.5–11.5)

## 2025-06-21 PROCEDURE — 36415 COLL VENOUS BLD VENIPUNCTURE: CPT | Performed by: INTERNAL MEDICINE

## 2025-06-21 PROCEDURE — 85025 COMPLETE CBC W/AUTO DIFF WBC: CPT | Performed by: INTERNAL MEDICINE

## 2025-06-21 PROCEDURE — 83520 IMMUNOASSAY QUANT NOS NONAB: CPT | Performed by: INTERNAL MEDICINE

## 2025-06-21 PROCEDURE — G0378 HOSPITAL OBSERVATION PER HR: HCPCS

## 2025-06-21 PROCEDURE — 25000003 PHARM REV CODE 250: Performed by: INTERNAL MEDICINE

## 2025-06-21 PROCEDURE — 96376 TX/PRO/DX INJ SAME DRUG ADON: CPT

## 2025-06-21 PROCEDURE — 80048 BASIC METABOLIC PNL TOTAL CA: CPT | Performed by: INTERNAL MEDICINE

## 2025-06-21 PROCEDURE — 25000003 PHARM REV CODE 250

## 2025-06-21 PROCEDURE — 63600175 PHARM REV CODE 636 W HCPCS: Performed by: INTERNAL MEDICINE

## 2025-06-21 PROCEDURE — 63600175 PHARM REV CODE 636 W HCPCS: Performed by: NURSE PRACTITIONER

## 2025-06-21 RX ORDER — DIPHENHYDRAMINE HCL 25 MG
25 CAPSULE ORAL EVERY 6 HOURS PRN
Start: 2025-06-21

## 2025-06-21 RX ORDER — FAMOTIDINE 20 MG/1
20 TABLET, FILM COATED ORAL 2 TIMES DAILY
Qty: 20 TABLET | Refills: 0 | Status: SHIPPED | OUTPATIENT
Start: 2025-06-21 | End: 2025-07-01

## 2025-06-21 RX ORDER — PREDNISONE 10 MG/1
TABLET ORAL
Qty: 20 TABLET | Refills: 0 | Status: SHIPPED | OUTPATIENT
Start: 2025-06-21 | End: 2025-06-29

## 2025-06-21 RX ORDER — CETIRIZINE HYDROCHLORIDE 10 MG/1
10 TABLET ORAL DAILY
Qty: 10 TABLET | Refills: 0 | Status: SHIPPED | OUTPATIENT
Start: 2025-06-21 | End: 2025-07-01

## 2025-06-21 RX ORDER — LOSARTAN POTASSIUM 25 MG/1
25 TABLET ORAL DAILY
Qty: 90 TABLET | Refills: 0 | Status: SHIPPED | OUTPATIENT
Start: 2025-06-21 | End: 2026-06-21

## 2025-06-21 RX ADMIN — FAMOTIDINE 20 MG: 10 INJECTION, SOLUTION INTRAVENOUS at 09:06

## 2025-06-21 RX ADMIN — CETIRIZINE HYDROCHLORIDE 10 MG: 10 TABLET, FILM COATED ORAL at 09:06

## 2025-06-21 RX ADMIN — DEXAMETHASONE SODIUM PHOSPHATE 4 MG: 4 INJECTION INTRA-ARTICULAR; INTRALESIONAL; INTRAMUSCULAR; INTRAVENOUS; SOFT TISSUE at 06:06

## 2025-06-21 RX ADMIN — HYDROCODONE BITARTRATE AND ACETAMINOPHEN 1 TABLET: 5; 325 TABLET ORAL at 06:06

## 2025-06-22 LAB
CENTROMERE QUANT (OHS): <0.4 U/ML
JO-1 AB QUANT (OHS): <0.3 U/ML
RNP70 AB QUANT (OHS): 2.9 U/ML
SCL-70S AB QUANT (OHS): <0.6 U/ML
SMITH AB QUANT (OHS): <0.7 U/ML
SSA(RO) AB QUANT (OHS): <0.4 U/ML
SSB(LA) AB QUANT (OHS): <0.4 U/ML
U1RNP AB QUANT (OHS): 2.7 U/ML
UPPER ARTERIAL LEFT ARM AXILLARY SYS MAX: 65 CM/S
UPPER ARTERIAL LEFT ARM BRACHIAL SYS MAX: 82.5 CM/S
UPPER ARTERIAL LEFT ARM RADIAL SYS MAX: 73 CM/S
UPPER ARTERIAL LEFT ARM SUBCLAVIAN SYS MAX: 52.2 CM/S
UPPER ARTERIAL LEFT ARM ULNAR SYS MAX: 39.9 CM/S
UPPER ARTERIAL RIGHT ARM AXILLARY SYS MAX: 97.5 CM/S
UPPER ARTERIAL RIGHT ARM BRACHIAL SYS MAX: 92.5 CM/S
UPPER ARTERIAL RIGHT ARM RADIAL SYS MAX: 79.2 CM/S
UPPER ARTERIAL RIGHT ARM SUBCLAVIAN SYS MAX: 43.1 CM/S
UPPER ARTERIAL RIGHT ARM ULNAR SYS MAX: 38.5 CM/S

## 2025-06-23 ENCOUNTER — PATIENT OUTREACH (OUTPATIENT)
Dept: ADMINISTRATIVE | Facility: CLINIC | Age: 50
End: 2025-06-23
Payer: MEDICARE

## 2025-06-23 NOTE — PROGRESS NOTES
C3 nurse attempted to reach the patient for a [facility] hospital discharge follow up call. The patient answered but requested a call back in about 15 minutes. No pcp.

## 2025-06-24 NOTE — PROGRESS NOTES
C3 nurse spoke with Bryon Paulson for a [facility] post hospital discharge follow up call. The patient does not have a scheduled HOSFU appointment with  Damon De La Cruz Jr., MD (Family Medicine) within 5-7 days post hospital discharge date 6/21/25. C3 nurse was unable to schedule HOSFU appointment in Epic or route message.  Patient stated he would call to schedule.

## 2025-06-25 LAB — C1INH ACT/NOR SERPL: 82 %

## 2025-06-26 NOTE — DISCHARGE SUMMARY
Hospital Medicine  Discharge Summary    Patient Name: Bryon Paulson  MRN: [id number]  Admit Date: 6/19/2025  Discharge Date: 6/21/2025   Status: OP- Observation   Length of Stay: 0      PHYSICIANS   Admitting Physician: Shawanda Oliveira MD  Discharging Physician: Christoph Pires MD.  Primary Care Physician: Anuja, Primary Doctor  Consults: Orthopedic Surgery      DISCHARGE DIAGNOSES   Suspected allergic reaction, possible angioedema  Essential HTN on lisinopril  DVT on Xarelto  h/o Crohns      PROCEDURES   None      HOSPITAL COURSE    49 y.o. male patient with a crohn disease, DVT, and HTN, presented to the ED for evaluation of facial swelling/hives.  Patient noticed swelling upon waking, and has persisted but necessarily worsened. He also noted swelling and rash to bilateral hands and feet, which are also pruritic. Denies difficulty breathing or swallowing.  He has taken Lisinopril for the past few years.  Denies any new meds, detergents, or soaps, or any exposure chemicals.  He does have crohn's disease but has not been on any steroids recently. Facial swelling improved, but still with hand and feet swelling, along with rash.  Continued on steroids and histamine blockers.  He was monitored for 48 hours and was stable.  No signs of respiratory issues throughout stay.  Advised to ACE-I going forward.  Will continue a steroid taper and histamine blockers, and have him follow with his PCP.      STATUS  Improved    DISPOSITION  Discharge to home    DIET  Regular    ACTIVITY  As tolerated      FOLLOW-UP      Follow-up Information       Damon De La Cruz Jr., MD. Schedule an appointment as soon as possible for a visit in 1 week(s).    Specialty: Family Medicine  Contact information:  [address] [id number] [phone]                            DISCHARGE MEDICATION RECONCILIATION        Medication List        START taking these medications      cetirizine 10 MG tablet  Commonly known  as: ZYRTEC  Take 1 tablet (10 mg total) by mouth once daily. for 10 days     diphenhydrAMINE 25 mg capsule  Commonly known as: BENADRYL  Take 1 capsule (25 mg total) by mouth every 6 (six) hours as needed for Itching (swelling).     famotidine 20 MG tablet  Commonly known as: PEPCID  Take 1 tablet (20 mg total) by mouth 2 (two) times daily. for 10 days     losartan 25 MG tablet  Commonly known as: COZAAR  Take 1 tablet (25 mg total) by mouth once daily.     predniSONE 10 MG tablet  Commonly known as: DELTASONE  Take 4 tablets (40 mg total) by mouth once daily for 2 days, THEN 3 tablets (30 mg total) once daily for 2 days, THEN 2 tablets (20 mg total) once daily for 2 days, THEN 1 tablet (10 mg total) once daily for 2 days.  Start taking on: June 21, 2025            CHANGE how you take these medications      HYDROcodone-acetaminophen  mg per tablet  Commonly known as: NORCO  Take 1 tablet by mouth every 6 (six) hours as needed for Pain.  What changed: Another medication with the same name was removed. Continue taking this medication, and follow the directions you see here.            STOP taking these medications      ondansetron 4 MG tablet  Commonly known as: ZOFRAN     ondansetron 4 MG Tbdl  Commonly known as: ZOFRAN-ODT     XARELTO DVT-PE TREAT 30D START 15 mg (42)- 20 mg (9) tablet dose pack  Generic drug: rivaroxaban               Where to Get Your Medications        These medications were sent to [facility]/pharmacy #[id number] - [place], [place] - [address] [facility]  [address] [place] [place] [address]      Phone: [phone]   cetirizine 10 MG tablet  famotidine 20 MG tablet  losartan 25 MG tablet  predniSONE 10 MG tablet       Information about where to get these medications is not yet available    Ask your nurse or doctor about these medications  diphenhydrAMINE 25 mg capsule         PHYSICAL EXAM   VITALS: T 97.8 °F (36.6 °C)   BP (!) 139/93   P 86   RR 16   O2 98 %    GENERAL: Awake and in NAD  LUNGS: CTA  anteriorly no wheezing or stridor  CVS: Normal rate  GI/: Soft, NT, bowel sounds positive.  EXTREMITIES: Radial pulses 2+  NEURO: AAOx3  PSYCH: Cooperative      Discharge time: 33 minutes     Christoph Pires MD  [facility]       DIAGNOSITCS   CBC:   Recent Labs   Lab 06/19/25  1314 06/20/25  0551 06/21/25  0501   WBC 6.39 9.62 14.73*   HGB 14.1 15.3 13.2*   HCT 44.7 44.0 39.9*    304 319       CMP:   Recent Labs   Lab 06/19/25  1314 06/20/25  0551 06/21/25  0501   GLU 86 141* 147*   CALCIUM 9.3 8.9 8.3*   ALBUMIN 3.9 3.4*  --    PROT 8.0 7.4  --     136 136   K 3.9 4.0 3.9   CO2 26 23 24    102 102   BUN 14.0 19.4 16.3   CREATININE 0.84 0.84 0.75   ALKPHOS 88 81  --    ALT 20 17  --    AST 22 14  --    BILITOT 0.4 0.4  --    MG 1.80  --   --      Estimated Creatinine Clearance: 112.6 mL/min (based on SCr of 0.75 mg/dL).      CV Ultrasound doppler arterial arms bilat  Result Date: 6/22/2025  The right upper extremity arterial system is patent with no evidence of focal stenosis or occlusion. The left upper extremity arterial system is patent with no evidence of focal stenosis or occlusion.     CT Hand Without Contrast Left  Result Date: 6/20/2025  EXAMINATION CT HAND WITHOUT CONTRAST LEFT CLINICAL HISTORY Soft tissue infection suspected, hand, xray done;left hand severe swelling and pain; TECHNIQUE Non-contrast helical-acquisition CT images of the left hand were obtained.  Multiplanar reconstructions accomplished by a CT technologist at a separate workstation, pushed to PACS for physician review. COMPARISON None available at the time of initial interpretation. FINDINGS Images were reviewed in bone and soft tissue windows. Exam quality: adequate for evaluation Alignment: Normal alignment of the hand bony structures. Bony structures: No fractures. Degenerative changes: No significant degenerative changes seen in the visualized bony structures of the wrist. Miscellaneous: There is generalized  swelling / subcutaneous edema of the right hand soft tissues, which is suggestive of cellulitis. No definitive organized fluid collection is identified to suggest focal abscess. No evidence of periosteal reaction is seen in the underlying osseous structures that would denote osteomyelitis. IMPRESSION Soft tissue findings suggestive of cellulitis.  No evidence of drainable abscess or acute/destructive osseous process. ========== No significant discrepancy identified in relation to the teleradiology preliminary report. RADIATION DOSE Automated tube current modulation, weight-based exposure dosing, and/or iterative reconstruction technique utilized to reach lowest reasonably achievable exposure rate. DLP: 113 mGy*cm Electronically signed by: Bakari Patino Date:    06/20/2025 Time:    07:06

## 2025-07-25 DIAGNOSIS — K50.919 CROHN'S DISEASE WITH COMPLICATION, UNSPECIFIED GASTROINTESTINAL TRACT LOCATION: Primary | ICD-10-CM
